# Patient Record
Sex: FEMALE | Race: WHITE | NOT HISPANIC OR LATINO | URBAN - NONMETROPOLITAN AREA
[De-identification: names, ages, dates, MRNs, and addresses within clinical notes are randomized per-mention and may not be internally consistent; named-entity substitution may affect disease eponyms.]

---

## 2020-09-23 ENCOUNTER — TELEPHONE ENCOUNTER (OUTPATIENT)
Dept: URBAN - NONMETROPOLITAN AREA CLINIC 2 | Facility: CLINIC | Age: 60
End: 2020-09-23

## 2020-10-07 ENCOUNTER — LAB OUTSIDE AN ENCOUNTER (OUTPATIENT)
Dept: URBAN - NONMETROPOLITAN AREA CLINIC 2 | Facility: CLINIC | Age: 60
End: 2020-10-07

## 2020-10-07 ENCOUNTER — OFFICE VISIT (OUTPATIENT)
Dept: URBAN - NONMETROPOLITAN AREA CLINIC 2 | Facility: CLINIC | Age: 60
End: 2020-10-07
Payer: COMMERCIAL

## 2020-10-07 VITALS
HEART RATE: 74 BPM | HEIGHT: 65 IN | DIASTOLIC BLOOD PRESSURE: 78 MMHG | BODY MASS INDEX: 33.32 KG/M2 | WEIGHT: 200 LBS | SYSTOLIC BLOOD PRESSURE: 138 MMHG

## 2020-10-07 DIAGNOSIS — K74.60 CIRRHOSIS: ICD-10-CM

## 2020-10-07 DIAGNOSIS — R10.9 ABDOMINAL PAIN: ICD-10-CM

## 2020-10-07 DIAGNOSIS — K21.9 GERD (GASTROESOPHAGEAL REFLUX DISEASE): ICD-10-CM

## 2020-10-07 DIAGNOSIS — E61.1 LOW IRON: ICD-10-CM

## 2020-10-07 DIAGNOSIS — K76.89: ICD-10-CM

## 2020-10-07 DIAGNOSIS — Z85.038 PERSONAL HISTORY OF COLON CANCER: ICD-10-CM

## 2020-10-07 DIAGNOSIS — R19.4 BOWEL HABIT CHANGES: ICD-10-CM

## 2020-10-07 PROCEDURE — 3017F COLORECTAL CA SCREEN DOC REV: CPT | Performed by: NURSE PRACTITIONER

## 2020-10-07 PROCEDURE — 99213 OFFICE O/P EST LOW 20 MIN: CPT | Performed by: NURSE PRACTITIONER

## 2020-10-07 PROCEDURE — G8427 DOCREV CUR MEDS BY ELIG CLIN: HCPCS | Performed by: NURSE PRACTITIONER

## 2020-10-07 PROCEDURE — G8420 CALC BMI NORM PARAMETERS: HCPCS | Performed by: NURSE PRACTITIONER

## 2020-10-07 RX ORDER — ZOLPIDEM TARTRATE 10 MG/1
TAKE 1 TABLET (10 MG) BY ORAL ROUTE ONCE DAILY AT BEDTIME TABLET, FILM COATED ORAL 1
Qty: 0 | Refills: 0 | Status: ACTIVE | COMMUNITY
Start: 1900-01-01

## 2020-10-07 RX ORDER — LACTULOSE 10 G/15ML
45 ML TID AS NEEDED FOR CONSTIPATION SOLUTION ORAL TID
Qty: 4050 ML | Refills: 6 | OUTPATIENT
Start: 2020-10-07 | End: 2021-05-05

## 2020-10-07 RX ORDER — DULOXETINE 60 MG/1
TAKE 2 CAPSULES (120 MG) BY ORAL ROUTE ONCE DAILY CAPSULE, DELAYED RELEASE PELLETS ORAL 1
Qty: 0 | Refills: 0 | Status: ACTIVE | COMMUNITY
Start: 1900-01-01

## 2020-10-07 RX ORDER — FAMOTIDINE 40 MG/1
TAKE 1 TABLET BY ORAL ROUTE DAILY FOR 90 DAYS TABLET, FILM COATED ORAL 1
Qty: 90 | Refills: 3 | Status: ACTIVE | COMMUNITY
Start: 2020-05-27 | End: 2021-05-22

## 2020-10-07 RX ORDER — ATENOLOL 50 MG/1
TAKE 1 TABLET (50 MG) BY ORAL ROUTE 2 TIMES PER DAY TABLET ORAL 2
Qty: 0 | Refills: 0 | Status: ACTIVE | COMMUNITY
Start: 1900-01-01

## 2020-10-07 RX ORDER — FAMOTIDINE 40 MG/1
TAKE 1 TABLET BY ORAL ROUTE AT BEDTIME FOR 90 DAYS TABLET, FILM COATED ORAL ONCE A DAY
Qty: 90 TABLET | Refills: 3
Start: 2020-05-27

## 2020-10-07 RX ORDER — OXYCODONE HYDROCHLORIDE 5 MG/1
CAPSULE ORAL
Qty: 0 | Refills: 0 | Status: ACTIVE | COMMUNITY
Start: 1900-01-01

## 2020-10-07 RX ORDER — PROMETHAZINE HYDROCHLORIDE 25 MG/1
TABLET ORAL
Qty: 0 | Refills: 0 | Status: ACTIVE | COMMUNITY
Start: 1900-01-01

## 2020-10-07 RX ORDER — TIZANIDINE HYDROCHLORIDE 4 MG/1
CAPSULE ORAL
Qty: 0 | Refills: 0 | Status: ACTIVE | COMMUNITY
Start: 1900-01-01

## 2020-10-07 RX ORDER — METFORMIN HYDROCHLORIDE 500 MG/1
TAKE 1 TABLET (500 MG) BY ORAL ROUTE 2 TIMES PER DAY WITH MORNING AND EVENING MEALS TABLET, COATED ORAL 2
Qty: 0 | Refills: 0 | Status: ACTIVE | COMMUNITY
Start: 1900-01-01

## 2020-10-07 RX ORDER — ROPINIROLE HYDROCHLORIDE 0.25 MG/1
TAKE 1 TABLET (0.25 MG) BY ORAL ROUTE 1-3 HOURS BEFORE BEDTIME TABLET, FILM COATED ORAL
Qty: 1 | Refills: 0 | Status: ACTIVE | COMMUNITY
Start: 1900-01-01

## 2020-10-07 NOTE — HPI-OTHER HISTORIES
History Of Present Illness    4/2019 Ms. 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 She is a previous patient of Dr. Abdalla and she has been seeing a hepatologist at [place] every 6 months. She has been getting MRI's every 6 months. Her last MRI was in January with dysplastic appearing nodules. These have been stable. EGD 2018 with trace varices, retained food in her stomach, and portal HTN. She is taking protonix 40mg BID and zantac. Her reflux well controlled. She denies any nausea or vomiting. She denies any ascites. She denies any free bleeding. She had mental changes with being forgetful and sedated in the morning. She had been taking Ambien. This is resolved. She is not taking lactulose.  She has a history of colon cancer in 2011 with resection and chemo. Last colonoscopy was 2018 and normal except for diverticulosis. She is having diarrhea every meal. Imodium is not helping.  CS  6/17/2019 Ms. Correa is here for f/u of Cirrhosis. She is well compensated. She had an MRI with stable dysplastic appearing nodules. Her reflux is well controleld on Protonix 40mg BID. She on atenolol for trace varices. She denies any increase in bleeding or ascites. She has some mild confusion thought to be med related. She was having diarrhea and given colestipol. This made her sick. She is now having constipation. CS  September 23, 2019 Ms. Dunaway is here for f/u of Cirrhosis. Her MRI ni June was stable and due in December. She denies any reflux since taking protonix 40mg BID. She denies any increase in bleeding. She has a sore on her shin that has been present for several months. She saw her PCP with a bx. She has had some all over swelling. Her PCP started her on a diuretic. She has been taking this for a month. Her potassium was a little low in June and has not been rechecked. She was having minor confusion at her last OV. She denies any further issues. She has constipation to diarrhea. She is not taking anything at this time. She is having some LLQ pain after eating. She was given ibuprofen by her PCP. She was given thyroid medication at her last PCP OV. She is not taking this.  She has had some numbness and tingling in her hands. She was told this was coming from her neck. She is seeing a surgeon next month. CS  February 24, 2020 Ms. Correa is here for f/u of Cirrhosis. She had a MRI in December that was stable. Her reflux is well controlled with PPI daily. She has mild breakthrough if she eats late. She denies any increase in bleeding. She denies any abdominal swelling. Her bowels are now normal. She does not need the colestipol or lactulose. She is having surgery next month on her neck. She overall is feeling well. She has not followed up with her PCP regarding her thyroid. CS

## 2020-10-07 NOTE — HPI-TODAY'S VISIT:
10/7/2020 Ms. Correa is here for f/u of Cirrhosis. She had surgery on her neck since her last OV. She is back on pain medication. She is starting to feel better. She is having some worsening reflux with food coming back up when she lays down. She is having some nausea. She is having some RUQ pain intermittently. She is due for MRI for HCC and surveillance of a dysplastic nodule. She denies any swelling, increase in bleeding, or mental changes. Her bowels are currently normal without lactulose. CS

## 2020-10-08 LAB
AFP, SERUM, TUMOR MARKER: 11.9
ALBUMIN: 3.7
ALKALINE PHOSPHATASE: 151
ALT (SGPT): 18
AST (SGOT): 31
BILIRUBIN, DIRECT: 0.29
BILIRUBIN, TOTAL: 0.8
BUN/CREATININE RATIO: 10
BUN: 7
CARBON DIOXIDE, TOTAL: 28
CHLORIDE: 94
CREATININE: 0.73
EGFR IF AFRICN AM: 104
EGFR IF NONAFRICN AM: 90
GLUCOSE: 241
HEMATOCRIT: 33.6
HEMOGLOBIN: 11.1
INR: 1.1
MCH: 25.5
MCHC: 33
MCV: 77
NRBC: (no result)
PLATELETS: 133
POTASSIUM: 3
PROTEIN, TOTAL: 6.9
PROTHROMBIN TIME: 11.8
RBC: 4.36
RDW: 15.6
SODIUM: 138
WBC: 4

## 2020-11-05 ENCOUNTER — TELEPHONE ENCOUNTER (OUTPATIENT)
Dept: URBAN - METROPOLITAN AREA CLINIC 92 | Facility: CLINIC | Age: 60
End: 2020-11-05

## 2020-11-05 RX ORDER — DIAZEPAM 2 MG/1
TAKE 1 TABLET BY ORAL ROUTE 30 MINS PRIOR TO MRI TABLET ORAL 2
Qty: 1 | Refills: 0
Start: 2019-09-23

## 2020-11-18 ENCOUNTER — OFFICE VISIT (OUTPATIENT)
Dept: URBAN - NONMETROPOLITAN AREA CLINIC 2 | Facility: CLINIC | Age: 60
End: 2020-11-18
Payer: COMMERCIAL

## 2020-11-18 VITALS
HEIGHT: 65 IN | SYSTOLIC BLOOD PRESSURE: 152 MMHG | BODY MASS INDEX: 34.39 KG/M2 | HEART RATE: 78 BPM | WEIGHT: 206.4 LBS | DIASTOLIC BLOOD PRESSURE: 81 MMHG | TEMPERATURE: 97.1 F

## 2020-11-18 DIAGNOSIS — R10.9 ABDOMINAL PAIN: ICD-10-CM

## 2020-11-18 DIAGNOSIS — E61.1 LOW IRON: ICD-10-CM

## 2020-11-18 DIAGNOSIS — Z85.038 PERSONAL HISTORY OF COLON CANCER: ICD-10-CM

## 2020-11-18 DIAGNOSIS — K76.89: ICD-10-CM

## 2020-11-18 DIAGNOSIS — K74.60 CIRRHOSIS: ICD-10-CM

## 2020-11-18 DIAGNOSIS — K21.9 GERD (GASTROESOPHAGEAL REFLUX DISEASE): ICD-10-CM

## 2020-11-18 DIAGNOSIS — R19.4 BOWEL HABIT CHANGES: ICD-10-CM

## 2020-11-18 PROCEDURE — G8420 CALC BMI NORM PARAMETERS: HCPCS | Performed by: NURSE PRACTITIONER

## 2020-11-18 PROCEDURE — 99213 OFFICE O/P EST LOW 20 MIN: CPT | Performed by: NURSE PRACTITIONER

## 2020-11-18 PROCEDURE — 3017F COLORECTAL CA SCREEN DOC REV: CPT | Performed by: NURSE PRACTITIONER

## 2020-11-18 PROCEDURE — G8427 DOCREV CUR MEDS BY ELIG CLIN: HCPCS | Performed by: NURSE PRACTITIONER

## 2020-11-18 RX ORDER — ROPINIROLE HYDROCHLORIDE 0.25 MG/1
TAKE 1 TABLET (0.25 MG) BY ORAL ROUTE 1-3 HOURS BEFORE BEDTIME TABLET, FILM COATED ORAL
Qty: 1 | Refills: 0 | Status: ACTIVE | COMMUNITY
Start: 1900-01-01

## 2020-11-18 RX ORDER — FAMOTIDINE 40 MG/1
TAKE 1 TABLET BY ORAL ROUTE AT BEDTIME FOR 90 DAYS TABLET, FILM COATED ORAL ONCE A DAY
Qty: 90 TABLET | Refills: 3

## 2020-11-18 RX ORDER — FAMOTIDINE 40 MG/1
TAKE 1 TABLET BY ORAL ROUTE AT BEDTIME FOR 90 DAYS TABLET, FILM COATED ORAL ONCE A DAY
Qty: 90 TABLET | Refills: 3 | Status: ACTIVE | COMMUNITY
Start: 2020-05-27

## 2020-11-18 RX ORDER — LACTULOSE 10 G/15ML
45 ML TID AS NEEDED FOR CONSTIPATION SOLUTION ORAL TID
Qty: 4050 ML | Refills: 6 | Status: ACTIVE | COMMUNITY
Start: 2020-10-07 | End: 2021-05-05

## 2020-11-18 RX ORDER — ATENOLOL 50 MG/1
TAKE 1 TABLET (50 MG) BY ORAL ROUTE 2 TIMES PER DAY TABLET ORAL 2
Qty: 0 | Refills: 0 | Status: ACTIVE | COMMUNITY
Start: 1900-01-01

## 2020-11-18 RX ORDER — ZOLPIDEM TARTRATE 10 MG/1
TAKE 1 TABLET (10 MG) BY ORAL ROUTE ONCE DAILY AT BEDTIME TABLET, FILM COATED ORAL 1
Qty: 0 | Refills: 0 | Status: ACTIVE | COMMUNITY
Start: 1900-01-01

## 2020-11-18 RX ORDER — DULOXETINE 60 MG/1
TAKE 2 CAPSULES (120 MG) BY ORAL ROUTE ONCE DAILY CAPSULE, DELAYED RELEASE PELLETS ORAL 1
Qty: 0 | Refills: 0 | Status: ACTIVE | COMMUNITY
Start: 1900-01-01

## 2020-11-18 RX ORDER — LACTULOSE 10 G/15ML
45 ML TID AS NEEDED FOR CONSTIPATION SOLUTION ORAL TID
Qty: 4050 ML | Refills: 6 | OUTPATIENT

## 2020-11-18 RX ORDER — PROMETHAZINE HYDROCHLORIDE 25 MG/1
TABLET ORAL
Qty: 0 | Refills: 0 | Status: ACTIVE | COMMUNITY
Start: 1900-01-01

## 2020-11-18 RX ORDER — METFORMIN HYDROCHLORIDE 500 MG/1
TAKE 1 TABLET (500 MG) BY ORAL ROUTE 2 TIMES PER DAY WITH MORNING AND EVENING MEALS TABLET, COATED ORAL 2
Qty: 0 | Refills: 0 | Status: ACTIVE | COMMUNITY
Start: 1900-01-01

## 2020-11-18 RX ORDER — DIAZEPAM 2 MG/1
TAKE 1 TABLET BY ORAL ROUTE 30 MINS PRIOR TO MRI TABLET ORAL 2
Qty: 1 | Refills: 0 | Status: ACTIVE | COMMUNITY
Start: 2019-09-23

## 2020-11-18 RX ORDER — TIZANIDINE HYDROCHLORIDE 4 MG/1
CAPSULE ORAL
Qty: 0 | Refills: 0 | Status: ACTIVE | COMMUNITY
Start: 1900-01-01

## 2020-11-18 RX ORDER — OXYCODONE HYDROCHLORIDE 5 MG/1
CAPSULE ORAL
Qty: 0 | Refills: 0 | Status: ACTIVE | COMMUNITY
Start: 1900-01-01

## 2020-11-18 NOTE — HPI-OTHER HISTORIES
History Of Present Illness    4/2019 Ms. 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 She is a previous patient of Dr. Abdalla and she has been seeing a hepatologist at [place] every 6 months. She has been getting MRI's every 6 months. Her last MRI was in January with dysplastic appearing nodules. These have been stable. EGD 2018 with trace varices, retained food in her stomach, and portal HTN. She is taking protonix 40mg BID and zantac. Her reflux well controlled. She denies any nausea or vomiting. She denies any ascites. She denies any free bleeding. She had mental changes with being forgetful and sedated in the morning. She had been taking Ambien. This is resolved. She is not taking lactulose.  She has a history of colon cancer in 2011 with resection and chemo. Last colonoscopy was 2018 and normal except for diverticulosis. She is having diarrhea every meal. Imodium is not helping.  CS  6/17/2019 Ms. Correa is here for f/u of Cirrhosis. She is well compensated. She had an MRI with stable dysplastic appearing nodules. Her reflux is well controleld on Protonix 40mg BID. She on atenolol for trace varices. She denies any increase in bleeding or ascites. She has some mild confusion thought to be med related. She was having diarrhea and given colestipol. This made her sick. She is now having constipation. CS  September 23, 2019 Ms. Dunaway is here for f/u of Cirrhosis. Her MRI ni June was stable and due in December. She denies any reflux since taking protonix 40mg BID. She denies any increase in bleeding. She has a sore on her shin that has been present for several months. She saw her PCP with a bx. She has had some all over swelling. Her PCP started her on a diuretic. She has been taking this for a month. Her potassium was a little low in June and has not been rechecked. She was having minor confusion at her last OV. She denies any further issues. She has constipation to diarrhea. She is not taking anything at this time. She is having some LLQ pain after eating. She was given ibuprofen by her PCP. She was given thyroid medication at her last PCP OV. She is not taking this.  She has had some numbness and tingling in her hands. She was told this was coming from her neck. She is seeing a surgeon next month. CS  February 24, 2020 Ms. Correa is here for f/u of Cirrhosis. She had a MRI in December that was stable. Her reflux is well controlled with PPI daily. She has mild breakthrough if she eats late. She denies any increase in bleeding. She denies any abdominal swelling. Her bowels are now normal. She does not need the colestipol or lactulose. She is having surgery next month on her neck. She overall is feeling well. She has not followed up with her PCP regarding her thyroid. CS   10/7/2020 Ms. Correa is here for f/u of Cirrhosis. She had surgery on her neck since her last OV. She is back on pain medication. She is starting to feel better. She is having some worsening reflux with food coming back up when she lays down. She is having some nausea. She is having some RUQ pain intermittently. She is due for MRI for HCC and surveillance of a dysplastic nodule. She denies any swelling, increase in bleeding, or mental changes. Her bowels are currently normal without lactulose. CS

## 2020-11-18 NOTE — HPI-TODAY'S VISIT:
11/18/2020 Ms. Correa is here for f/u of Cirrhosis. She had surgery on her neck and was started on pain medication. This made her reflux worse. She was started on pepcid. She was unable to take this because she felt it made her swell. She is no longer having this symptoms. She does however had a lot of bloating and gas. She is no longer taking lactulose and having some constipation. She had her MRI 11/12 for HCC and surveillance of a dysplastic nodule this was stable. She denies any swelling, increase in bleeding, or mental changes. She is due for colonoscopy next year. CS

## 2021-02-18 ENCOUNTER — OFFICE VISIT (OUTPATIENT)
Dept: URBAN - NONMETROPOLITAN AREA CLINIC 2 | Facility: CLINIC | Age: 61
End: 2021-02-18

## 2021-02-25 ENCOUNTER — LAB OUTSIDE AN ENCOUNTER (OUTPATIENT)
Dept: URBAN - NONMETROPOLITAN AREA CLINIC 2 | Facility: CLINIC | Age: 61
End: 2021-02-25

## 2021-02-25 ENCOUNTER — OFFICE VISIT (OUTPATIENT)
Dept: URBAN - NONMETROPOLITAN AREA CLINIC 2 | Facility: CLINIC | Age: 61
End: 2021-02-25
Payer: COMMERCIAL

## 2021-02-25 VITALS
HEART RATE: 70 BPM | HEIGHT: 65 IN | WEIGHT: 197 LBS | DIASTOLIC BLOOD PRESSURE: 85 MMHG | TEMPERATURE: 97.5 F | BODY MASS INDEX: 32.82 KG/M2 | SYSTOLIC BLOOD PRESSURE: 133 MMHG

## 2021-02-25 DIAGNOSIS — R19.4 BOWEL HABIT CHANGES: ICD-10-CM

## 2021-02-25 DIAGNOSIS — K21.9 GERD (GASTROESOPHAGEAL REFLUX DISEASE): ICD-10-CM

## 2021-02-25 DIAGNOSIS — Z85.038 PERSONAL HISTORY OF COLON CANCER: ICD-10-CM

## 2021-02-25 DIAGNOSIS — K74.60 CIRRHOSIS: ICD-10-CM

## 2021-02-25 DIAGNOSIS — K76.89: ICD-10-CM

## 2021-02-25 DIAGNOSIS — E61.1 LOW IRON: ICD-10-CM

## 2021-02-25 DIAGNOSIS — R10.9 ABDOMINAL PAIN: ICD-10-CM

## 2021-02-25 PROCEDURE — 99214 OFFICE O/P EST MOD 30 MIN: CPT | Performed by: NURSE PRACTITIONER

## 2021-02-25 RX ORDER — DULOXETINE 60 MG/1
TAKE 2 CAPSULES (120 MG) BY ORAL ROUTE ONCE DAILY CAPSULE, DELAYED RELEASE PELLETS ORAL 1
Qty: 0 | Refills: 0 | Status: ACTIVE | COMMUNITY
Start: 1900-01-01

## 2021-02-25 RX ORDER — LACTULOSE 10 G/15ML
45 ML TID AS NEEDED FOR CONSTIPATION SOLUTION ORAL TID
Qty: 4050 ML | Refills: 6 | Status: ACTIVE | COMMUNITY

## 2021-02-25 RX ORDER — FAMOTIDINE 40 MG/1
TAKE 1 TABLET BY ORAL ROUTE AT BEDTIME FOR 90 DAYS TABLET, FILM COATED ORAL ONCE A DAY
Qty: 90 TABLET | Refills: 3 | Status: ACTIVE | COMMUNITY

## 2021-02-25 RX ORDER — OXYCODONE HYDROCHLORIDE 5 MG/1
CAPSULE ORAL
Qty: 0 | Refills: 0 | Status: ACTIVE | COMMUNITY
Start: 1900-01-01

## 2021-02-25 RX ORDER — FAMOTIDINE 40 MG/1
TAKE 1 TABLET BY ORAL ROUTE AT BEDTIME FOR 90 DAYS TABLET, FILM COATED ORAL ONCE A DAY
Qty: 90 TABLET | Refills: 3

## 2021-02-25 RX ORDER — ZOLPIDEM TARTRATE 10 MG/1
TAKE 1 TABLET (10 MG) BY ORAL ROUTE ONCE DAILY AT BEDTIME TABLET, FILM COATED ORAL 1
Qty: 0 | Refills: 0 | Status: ACTIVE | COMMUNITY
Start: 1900-01-01

## 2021-02-25 RX ORDER — TIZANIDINE HYDROCHLORIDE 4 MG/1
CAPSULE ORAL
Qty: 0 | Refills: 0 | Status: ACTIVE | COMMUNITY
Start: 1900-01-01

## 2021-02-25 RX ORDER — ROPINIROLE HYDROCHLORIDE 0.25 MG/1
TAKE 1 TABLET (0.25 MG) BY ORAL ROUTE 1-3 HOURS BEFORE BEDTIME TABLET, FILM COATED ORAL
Qty: 1 | Refills: 0 | Status: ACTIVE | COMMUNITY
Start: 1900-01-01

## 2021-02-25 RX ORDER — DIAZEPAM 2 MG/1
TAKE 1 TABLET BY ORAL ROUTE 30 MINS PRIOR TO MRI TABLET ORAL 2
Qty: 1 | Refills: 0 | Status: ACTIVE | COMMUNITY
Start: 2019-09-23

## 2021-02-25 RX ORDER — LACTULOSE 10 G/15ML
45 ML TID AS NEEDED FOR CONSTIPATION SOLUTION ORAL TID
Qty: 4050 ML | Refills: 6 | OUTPATIENT

## 2021-02-25 RX ORDER — PROMETHAZINE HYDROCHLORIDE 25 MG/1
TABLET ORAL
Qty: 0 | Refills: 0 | Status: ACTIVE | COMMUNITY
Start: 1900-01-01

## 2021-02-25 RX ORDER — ATENOLOL 50 MG/1
TAKE 1 TABLET (50 MG) BY ORAL ROUTE 2 TIMES PER DAY TABLET ORAL 2
Qty: 0 | Refills: 0 | Status: ACTIVE | COMMUNITY
Start: 1900-01-01

## 2021-02-25 RX ORDER — METFORMIN HYDROCHLORIDE 500 MG/1
TAKE 1 TABLET (500 MG) BY ORAL ROUTE 2 TIMES PER DAY WITH MORNING AND EVENING MEALS TABLET, COATED ORAL 2
Qty: 0 | Refills: 0 | Status: ACTIVE | COMMUNITY
Start: 1900-01-01

## 2021-02-25 NOTE — HPI-TODAY'S VISIT:
2/25/2021 Ms. Correa is here for f/u of Cirrhosis and colon cancer surgery. She is out of her pain medication today and having a lot of nerve pain down her legs. This makes it hard to walk at times. Her reflux is well controlled. Her bowels are more normal without lactulose or colestipol. if she gets backed up she will take lactulose with relief. She denies any ascites. She denies any bleeding. She denies any mental changes. She had her MRI 11/12 for HCC and surveillance of a dysplastic nodule this was stable. Overall, she is feeling well. CS

## 2021-02-25 NOTE — HPI-OTHER HISTORIES
History Of Present Illness    4/2019 Ms. 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 She is a previous patient of Dr. Abdalla and she has been seeing a hepatologist at [place] every 6 months. She has been getting MRI's every 6 months. Her last MRI was in January with dysplastic appearing nodules. These have been stable. EGD 2018 with trace varices, retained food in her stomach, and portal HTN. She is taking protonix 40mg BID and zantac. Her reflux well controlled. She denies any nausea or vomiting. She denies any ascites. She denies any free bleeding. She had mental changes with being forgetful and sedated in the morning. She had been taking Ambien. This is resolved. She is not taking lactulose.  She has a history of colon cancer in 2011 with resection and chemo. Last colonoscopy was 2018 and normal except for diverticulosis. She is having diarrhea every meal. Imodium is not helping.  CS  6/17/2019 Ms. Correa is here for f/u of Cirrhosis. She is well compensated. She had an MRI with stable dysplastic appearing nodules. Her reflux is well controleld on Protonix 40mg BID. She on atenolol for trace varices. She denies any increase in bleeding or ascites. She has some mild confusion thought to be med related. She was having diarrhea and given colestipol. This made her sick. She is now having constipation. CS  September 23, 2019 Ms. Dunaway is here for f/u of Cirrhosis. Her MRI ni June was stable and due in December. She denies any reflux since taking protonix 40mg BID. She denies any increase in bleeding. She has a sore on her shin that has been present for several months. She saw her PCP with a bx. She has had some all over swelling. Her PCP started her on a diuretic. She has been taking this for a month. Her potassium was a little low in June and has not been rechecked. She was having minor confusion at her last OV. She denies any further issues. She has constipation to diarrhea. She is not taking anything at this time. She is having some LLQ pain after eating. She was given ibuprofen by her PCP. She was given thyroid medication at her last PCP OV. She is not taking this.  She has had some numbness and tingling in her hands. She was told this was coming from her neck. She is seeing a surgeon next month. CS  February 24, 2020 Ms. Correa is here for f/u of Cirrhosis. She had a MRI in December that was stable. Her reflux is well controlled with PPI daily. She has mild breakthrough if she eats late. She denies any increase in bleeding. She denies any abdominal swelling. Her bowels are now normal. She does not need the colestipol or lactulose. She is having surgery next month on her neck. She overall is feeling well. She has not followed up with her PCP regarding her thyroid. CS   10/7/2020 Ms. Correa is here for f/u of Cirrhosis. She had surgery on her neck since her last OV. She is back on pain medication. She is starting to feel better. She is having some worsening reflux with food coming back up when she lays down. She is having some nausea. She is having some RUQ pain intermittently. She is due for MRI for HCC and surveillance of a dysplastic nodule. She denies any swelling, increase in bleeding, or mental changes. Her bowels are currently normal without lactulose. CS   11/18/2020 Ms. Correa is here for f/u of Cirrhosis. She had surgery on her neck and was started on pain medication. This made her reflux worse. She was started on pepcid. She was unable to take this because she felt it made her swell. She is no longer having this symptoms. She does however had a lot of bloating and gas. She is no longer taking lactulose and having some constipation. She had her MRI 11/12 for HCC and surveillance of a dysplastic nodule this was stable. She denies any swelling, increase in bleeding, or mental changes. She is due for colonoscopy next year. CS

## 2021-02-26 LAB
ALBUMIN: 4.1
ALKALINE PHOSPHATASE: 136
ALT (SGPT): 18
AST (SGOT): 24
BILIRUBIN, DIRECT: 0.21
BILIRUBIN, TOTAL: 0.5
BUN/CREATININE RATIO: 13
BUN: 9
CARBON DIOXIDE, TOTAL: 27
CHLORIDE: 98
CREATININE: 0.72
EGFR IF AFRICN AM: 105
EGFR IF NONAFRICN AM: 91
GLUCOSE: 114
HEMATOCRIT: 42
HEMOGLOBIN: 13.1
INR: 1.1
IRON BIND.CAP.(TIBC): 483
IRON SATURATION: 8
IRON: 40
MCH: 24.6
MCHC: 31.2
MCV: 79
NRBC: (no result)
PLATELETS: 202
POTASSIUM: 3.4
PROTEIN, TOTAL: 7.3
PROTHROMBIN TIME: 11.3
RBC: 5.33
RDW: 16.5
SODIUM: 142
UIBC: 443
WBC: 10.2

## 2021-04-01 ENCOUNTER — OFFICE VISIT (OUTPATIENT)
Dept: URBAN - NONMETROPOLITAN AREA SURGERY CENTER 1 | Facility: SURGERY CENTER | Age: 61
End: 2021-04-01
Payer: COMMERCIAL

## 2021-04-01 DIAGNOSIS — K62.89 ANAL BURNING: ICD-10-CM

## 2021-04-01 DIAGNOSIS — Z85.038 H/O COLON CANCER, STAGE I: ICD-10-CM

## 2021-04-01 PROCEDURE — G8907 PT DOC NO EVENTS ON DISCHARG: HCPCS | Performed by: INTERNAL MEDICINE

## 2021-04-01 PROCEDURE — 45385 COLONOSCOPY W/LESION REMOVAL: CPT | Performed by: INTERNAL MEDICINE

## 2021-05-26 ENCOUNTER — WEB ENCOUNTER (OUTPATIENT)
Dept: URBAN - NONMETROPOLITAN AREA CLINIC 2 | Facility: CLINIC | Age: 61
End: 2021-05-26

## 2021-05-26 ENCOUNTER — OFFICE VISIT (OUTPATIENT)
Dept: URBAN - NONMETROPOLITAN AREA CLINIC 2 | Facility: CLINIC | Age: 61
End: 2021-05-26
Payer: COMMERCIAL

## 2021-05-26 VITALS
WEIGHT: 195.4 LBS | SYSTOLIC BLOOD PRESSURE: 111 MMHG | HEART RATE: 64 BPM | TEMPERATURE: 96.9 F | HEIGHT: 65 IN | BODY MASS INDEX: 32.55 KG/M2 | DIASTOLIC BLOOD PRESSURE: 61 MMHG

## 2021-05-26 DIAGNOSIS — K74.60 CIRRHOSIS: ICD-10-CM

## 2021-05-26 DIAGNOSIS — K76.89: ICD-10-CM

## 2021-05-26 DIAGNOSIS — R10.9 ABDOMINAL PAIN: ICD-10-CM

## 2021-05-26 DIAGNOSIS — K21.9 GERD (GASTROESOPHAGEAL REFLUX DISEASE): ICD-10-CM

## 2021-05-26 DIAGNOSIS — R19.4 BOWEL HABIT CHANGES: ICD-10-CM

## 2021-05-26 DIAGNOSIS — Z85.038 PERSONAL HISTORY OF COLON CANCER: ICD-10-CM

## 2021-05-26 PROCEDURE — 99214 OFFICE O/P EST MOD 30 MIN: CPT | Performed by: INTERNAL MEDICINE

## 2021-05-26 RX ORDER — ROPINIROLE HYDROCHLORIDE 0.25 MG/1
TAKE 1 TABLET (0.25 MG) BY ORAL ROUTE 1-3 HOURS BEFORE BEDTIME TABLET, FILM COATED ORAL
Qty: 1 | Refills: 0 | COMMUNITY
Start: 1900-01-01

## 2021-05-26 RX ORDER — PROMETHAZINE HYDROCHLORIDE 25 MG/1
TABLET ORAL
Qty: 0 | Refills: 0 | COMMUNITY
Start: 1900-01-01

## 2021-05-26 RX ORDER — LACTULOSE 10 G/15ML
45 ML TID AS NEEDED FOR CONSTIPATION SOLUTION ORAL TID
Qty: 4050 ML | Refills: 6 | OUTPATIENT

## 2021-05-26 RX ORDER — OXYCODONE HYDROCHLORIDE 5 MG/1
CAPSULE ORAL
Qty: 0 | Refills: 0 | COMMUNITY
Start: 1900-01-01

## 2021-05-26 RX ORDER — DIAZEPAM 2 MG/1
TAKE 1 TABLET BY ORAL ROUTE 30 MINS PRIOR TO MRI TABLET ORAL 2
Qty: 1 | Refills: 0 | COMMUNITY
Start: 2019-09-23

## 2021-05-26 RX ORDER — DIAZEPAM 2 MG/1
TAKE 1 TABLET BY ORAL ROUTE 30 MINS PRIOR TO MRI TABLET ORAL 2
Qty: 1 | Refills: 0
Start: 2019-09-23

## 2021-05-26 RX ORDER — METFORMIN HYDROCHLORIDE 500 MG/1
TAKE 1 TABLET (500 MG) BY ORAL ROUTE 2 TIMES PER DAY WITH MORNING AND EVENING MEALS TABLET, COATED ORAL 2
Qty: 0 | Refills: 0 | COMMUNITY
Start: 1900-01-01

## 2021-05-26 RX ORDER — DULOXETINE 60 MG/1
TAKE 2 CAPSULES (120 MG) BY ORAL ROUTE ONCE DAILY CAPSULE, DELAYED RELEASE PELLETS ORAL 1
Qty: 0 | Refills: 0 | COMMUNITY
Start: 1900-01-01

## 2021-05-26 RX ORDER — FAMOTIDINE 40 MG/1
TAKE 1 TABLET BY ORAL ROUTE AT BEDTIME FOR 90 DAYS TABLET, FILM COATED ORAL ONCE A DAY
Qty: 90 TABLET | Refills: 3 | COMMUNITY

## 2021-05-26 RX ORDER — LACTULOSE 10 G/15ML
45 ML TID AS NEEDED FOR CONSTIPATION SOLUTION ORAL TID
Qty: 4050 ML | Refills: 6 | COMMUNITY

## 2021-05-26 RX ORDER — ZOLPIDEM TARTRATE 10 MG/1
TAKE 1 TABLET (10 MG) BY ORAL ROUTE ONCE DAILY AT BEDTIME TABLET, FILM COATED ORAL 1
Qty: 0 | Refills: 0 | COMMUNITY
Start: 1900-01-01

## 2021-05-26 RX ORDER — ATENOLOL 50 MG/1
TAKE 1 TABLET (50 MG) BY ORAL ROUTE 2 TIMES PER DAY TABLET ORAL 2
Qty: 0 | Refills: 0 | COMMUNITY
Start: 1900-01-01

## 2021-05-26 RX ORDER — TIZANIDINE HYDROCHLORIDE 4 MG/1
CAPSULE ORAL
Qty: 0 | Refills: 0 | COMMUNITY
Start: 1900-01-01

## 2021-05-26 RX ORDER — FAMOTIDINE 40 MG/1
TAKE 1 TABLET BY ORAL ROUTE AT BEDTIME FOR 90 DAYS TABLET, FILM COATED ORAL ONCE A DAY
Qty: 90 TABLET | Refills: 3

## 2021-05-26 NOTE — HPI-OTHER HISTORIES
History Of Present Illness    4/2019 Ms. 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 She is a previous patient of Dr. Abdalla and she has been seeing a hepatologist at [place] every 6 months. She has been getting MRI's every 6 months. Her last MRI was in January with dysplastic appearing nodules. These have been stable. EGD 2018 with trace varices, retained food in her stomach, and portal HTN. She is taking protonix 40mg BID and zantac. Her reflux well controlled. She denies any nausea or vomiting. She denies any ascites. She denies any free bleeding. She had mental changes with being forgetful and sedated in the morning. She had been taking Ambien. This is resolved. She is not taking lactulose.  She has a history of colon cancer in 2011 with resection and chemo. Last colonoscopy was 2018 and normal except for diverticulosis. She is having diarrhea every meal. Imodium is not helping.  CS  6/17/2019 Ms. Correa is here for f/u of Cirrhosis. She is well compensated. She had an MRI with stable dysplastic appearing nodules. Her reflux is well controleld on Protonix 40mg BID. She on atenolol for trace varices. She denies any increase in bleeding or ascites. She has some mild confusion thought to be med related. She was having diarrhea and given colestipol. This made her sick. She is now having constipation. CS  September 23, 2019 Ms. Dunaway is here for f/u of Cirrhosis. Her MRI ni June was stable and due in December. She denies any reflux since taking protonix 40mg BID. She denies any increase in bleeding. She has a sore on her shin that has been present for several months. She saw her PCP with a bx. She has had some all over swelling. Her PCP started her on a diuretic. She has been taking this for a month. Her potassium was a little low in June and has not been rechecked. She was having minor confusion at her last OV. She denies any further issues. She has constipation to diarrhea. She is not taking anything at this time. She is having some LLQ pain after eating. She was given ibuprofen by her PCP. She was given thyroid medication at her last PCP OV. She is not taking this.  She has had some numbness and tingling in her hands. She was told this was coming from her neck. She is seeing a surgeon next month. CS  February 24, 2020 Ms. Correa is here for f/u of Cirrhosis. She had a MRI in December that was stable. Her reflux is well controlled with PPI daily. She has mild breakthrough if she eats late. She denies any increase in bleeding. She denies any abdominal swelling. Her bowels are now normal. She does not need the colestipol or lactulose. She is having surgery next month on her neck. She overall is feeling well. She has not followed up with her PCP regarding her thyroid. CS   10/7/2020 Ms. Correa is here for f/u of Cirrhosis. She had surgery on her neck since her last OV. She is back on pain medication. She is starting to feel better. She is having some worsening reflux with food coming back up when she lays down. She is having some nausea. She is having some RUQ pain intermittently. She is due for MRI for HCC and surveillance of a dysplastic nodule. She denies any swelling, increase in bleeding, or mental changes. Her bowels are currently normal without lactulose. CS   11/18/2020 Ms. Correa is here for f/u of Cirrhosis. She had surgery on her neck and was started on pain medication. This made her reflux worse. She was started on pepcid. She was unable to take this because she felt it made her swell. She is no longer having this symptoms. She does however had a lot of bloating and gas. She is no longer taking lactulose and having some constipation. She had her MRI 11/12 for HCC and surveillance of a dysplastic nodule this was stable. She denies any swelling, increase in bleeding, or mental changes. She is due for colonoscopy next year. CS   2/25/2021 Ms. Correa is here for f/u of Cirrhosis and colon cancer surgery. She is out of her pain medication today and having a lot of nerve pain down her legs. This makes it hard to walk at times. Her reflux is well controlled. Her bowels are more normal without lactulose or colestipol. if she gets backed up she will take lactulose with relief. She denies any ascites. She denies any bleeding. She denies any mental changes. She had her MRI 11/12 for HCC and surveillance of a dysplastic nodule this was stable. Overall, she is feeling well. CS

## 2021-05-26 NOTE — HPI-TODAY'S VISIT:
5/26/2021 Ms. Correa is here for f/u of Cirrhosis and colonoscopy. She continues to have pain but this is fairly well controlled with pain medication. She tries to limit how much she takes. She does have some minor fatigue from this. She denies any confusion or sedation. Her reflux is well controlled. Her bowels are more normal with lactulose prn. She has bloating at times but not too bothersome.  She denies any ascites. She denies any bleeding. She denies any mental changes. She had her MRI 11/12 for HCC and surveillance of a dysplastic nodule this was stable. She is due for repeat. Her repeat colonoscopy showed a 4mm inflammatory polyp in the rectum. Overall, she is feeling well. CS

## 2021-05-27 LAB
AFP, SERUM, TUMOR MARKER: 15
ALBUMIN: 4
ALKALINE PHOSPHATASE: 128
ALT (SGPT): 14
AST (SGOT): 30
BILIRUBIN, DIRECT: 0.3
BILIRUBIN, TOTAL: 0.8
BUN/CREATININE RATIO: 11
BUN: 8
CARBON DIOXIDE, TOTAL: 26
CHLORIDE: 95
CREATININE: 0.75
EGFR IF AFRICN AM: 99
EGFR IF NONAFRICN AM: 86
GLUCOSE: 117
HEMATOCRIT: 39.5
HEMOGLOBIN: 12.7
INR: 1.1
MCH: 26.1
MCHC: 32.2
MCV: 81
NRBC: (no result)
PLATELETS: 137
POTASSIUM: 3.4
PROTEIN, TOTAL: 7.1
PROTHROMBIN TIME: 11.6
RBC: 4.87
RDW: (no result)
SODIUM: 137
WBC: 6.5

## 2021-07-20 ENCOUNTER — TELEPHONE ENCOUNTER (OUTPATIENT)
Dept: URBAN - NONMETROPOLITAN AREA CLINIC 2 | Facility: CLINIC | Age: 61
End: 2021-07-20

## 2021-08-25 ENCOUNTER — OFFICE VISIT (OUTPATIENT)
Dept: URBAN - NONMETROPOLITAN AREA CLINIC 2 | Facility: CLINIC | Age: 61
End: 2021-08-25

## 2021-10-04 ENCOUNTER — WEB ENCOUNTER (OUTPATIENT)
Dept: URBAN - NONMETROPOLITAN AREA CLINIC 2 | Facility: CLINIC | Age: 61
End: 2021-10-04

## 2021-10-04 ENCOUNTER — OFFICE VISIT (OUTPATIENT)
Dept: URBAN - NONMETROPOLITAN AREA CLINIC 2 | Facility: CLINIC | Age: 61
End: 2021-10-04
Payer: COMMERCIAL

## 2021-10-04 VITALS
TEMPERATURE: 97.7 F | SYSTOLIC BLOOD PRESSURE: 143 MMHG | BODY MASS INDEX: 33.32 KG/M2 | WEIGHT: 200 LBS | DIASTOLIC BLOOD PRESSURE: 82 MMHG | HEIGHT: 65 IN | HEART RATE: 80 BPM

## 2021-10-04 DIAGNOSIS — K76.89: ICD-10-CM

## 2021-10-04 DIAGNOSIS — Z85.038 PERSONAL HISTORY OF COLON CANCER: ICD-10-CM

## 2021-10-04 DIAGNOSIS — R10.84 ABDOMINAL PAIN, GENERALIZED: ICD-10-CM

## 2021-10-04 DIAGNOSIS — K74.69 OTHER CIRRHOSIS OF LIVER: ICD-10-CM

## 2021-10-04 PROCEDURE — 99214 OFFICE O/P EST MOD 30 MIN: CPT | Performed by: NURSE PRACTITIONER

## 2021-10-04 RX ORDER — LACTULOSE 10 G/15ML
45 ML TID AS NEEDED FOR CONSTIPATION SOLUTION ORAL TID
Qty: 4050 ML | Refills: 6 | OUTPATIENT

## 2021-10-04 RX ORDER — FAMOTIDINE 40 MG/1
TAKE 1 TABLET BY ORAL ROUTE AT BEDTIME FOR 90 DAYS TABLET, FILM COATED ORAL ONCE A DAY
Qty: 90 TABLET | Refills: 3

## 2021-10-04 RX ORDER — ATENOLOL 50 MG/1
TAKE 1 TABLET (50 MG) BY ORAL ROUTE 2 TIMES PER DAY TABLET ORAL 2
Qty: 0 | Refills: 0 | COMMUNITY
Start: 1900-01-01

## 2021-10-04 RX ORDER — DIAZEPAM 2 MG/1
TAKE 1 TABLET BY ORAL ROUTE 30 MINS PRIOR TO MRI TABLET ORAL 2
Qty: 1 | Refills: 0

## 2021-10-04 RX ORDER — METFORMIN HYDROCHLORIDE 500 MG/1
TAKE 1 TABLET (500 MG) BY ORAL ROUTE 2 TIMES PER DAY WITH MORNING AND EVENING MEALS TABLET, COATED ORAL 2
Qty: 0 | Refills: 0 | COMMUNITY
Start: 1900-01-01

## 2021-10-04 RX ORDER — ROPINIROLE HYDROCHLORIDE 0.25 MG/1
TAKE 1 TABLET (0.25 MG) BY ORAL ROUTE 1-3 HOURS BEFORE BEDTIME TABLET, FILM COATED ORAL
Qty: 1 | Refills: 0 | COMMUNITY
Start: 1900-01-01

## 2021-10-04 RX ORDER — DULOXETINE 60 MG/1
TAKE 2 CAPSULES (120 MG) BY ORAL ROUTE ONCE DAILY CAPSULE, DELAYED RELEASE PELLETS ORAL 1
Qty: 0 | Refills: 0 | COMMUNITY
Start: 1900-01-01

## 2021-10-04 RX ORDER — OXYCODONE HYDROCHLORIDE 5 MG/1
CAPSULE ORAL
Qty: 0 | Refills: 0 | COMMUNITY
Start: 1900-01-01

## 2021-10-04 RX ORDER — ZOLPIDEM TARTRATE 10 MG/1
TAKE 1 TABLET (10 MG) BY ORAL ROUTE ONCE DAILY AT BEDTIME TABLET, FILM COATED ORAL 1
Qty: 0 | Refills: 0 | COMMUNITY
Start: 1900-01-01

## 2021-10-04 RX ORDER — DIAZEPAM 2 MG/1
TAKE 1 TABLET BY ORAL ROUTE 30 MINS PRIOR TO MRI TABLET ORAL 2
Qty: 1 | Refills: 0 | Status: ACTIVE | COMMUNITY
Start: 2019-09-23

## 2021-10-04 RX ORDER — FAMOTIDINE 40 MG/1
TAKE 1 TABLET BY ORAL ROUTE AT BEDTIME FOR 90 DAYS TABLET, FILM COATED ORAL ONCE A DAY
Qty: 90 TABLET | Refills: 3 | Status: ACTIVE | COMMUNITY

## 2021-10-04 RX ORDER — TIZANIDINE HYDROCHLORIDE 4 MG/1
CAPSULE ORAL
Qty: 0 | Refills: 0 | COMMUNITY
Start: 1900-01-01

## 2021-10-04 RX ORDER — LACTULOSE 10 G/15ML
45 ML TID AS NEEDED FOR CONSTIPATION SOLUTION ORAL TID
Qty: 4050 ML | Refills: 6 | Status: ACTIVE | COMMUNITY

## 2021-10-04 RX ORDER — PROMETHAZINE HYDROCHLORIDE 25 MG/1
TABLET ORAL
Qty: 0 | Refills: 0 | COMMUNITY
Start: 1900-01-01

## 2021-10-04 NOTE — HPI-TODAY'S VISIT:
10/4/2021 Ms. Correa is here for f/u of Cirrhosis. Since her last OV, she fell and fractured her patella and bruised her right eye. She was taking to the ER and had a negative head CT. She is making a little trouble getting around with her knee brace but doing ok. She denies any ascites. She has occassional fluid build up everywhere that her PCP has given  her lasix and potassium. She denies any constipation at this time. She will take lactulose if this happens and it works well. She denies any mental changes. She denies is bleeding. Her MRI this summer was negative for HCC and due in December. CS

## 2021-10-04 NOTE — HPI-OTHER HISTORIES
History Of Present Illness    4/2019 Ms. 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 She is a previous patient of Dr. Abdalla and she has been seeing a hepatologist at [place] every 6 months. She has been getting MRI's every 6 months. Her last MRI was in January with dysplastic appearing nodules. These have been stable. EGD 2018 with trace varices, retained food in her stomach, and portal HTN. She is taking protonix 40mg BID and zantac. Her reflux well controlled. She denies any nausea or vomiting. She denies any ascites. She denies any free bleeding. She had mental changes with being forgetful and sedated in the morning. She had been taking Ambien. This is resolved. She is not taking lactulose.  She has a history of colon cancer in 2011 with resection and chemo. Last colonoscopy was 2018 and normal except for diverticulosis. She is having diarrhea every meal. Imodium is not helping.  CS  6/17/2019 Ms. Correa is here for f/u of Cirrhosis. She is well compensated. She had an MRI with stable dysplastic appearing nodules. Her reflux is well controleld on Protonix 40mg BID. She on atenolol for trace varices. She denies any increase in bleeding or ascites. She has some mild confusion thought to be med related. She was having diarrhea and given colestipol. This made her sick. She is now having constipation. CS  September 23, 2019 Ms. Dunaway is here for f/u of Cirrhosis. Her MRI ni June was stable and due in December. She denies any reflux since taking protonix 40mg BID. She denies any increase in bleeding. She has a sore on her shin that has been present for several months. She saw her PCP with a bx. She has had some all over swelling. Her PCP started her on a diuretic. She has been taking this for a month. Her potassium was a little low in June and has not been rechecked. She was having minor confusion at her last OV. She denies any further issues. She has constipation to diarrhea. She is not taking anything at this time. She is having some LLQ pain after eating. She was given ibuprofen by her PCP. She was given thyroid medication at her last PCP OV. She is not taking this.  She has had some numbness and tingling in her hands. She was told this was coming from her neck. She is seeing a surgeon next month. CS  February 24, 2020 Ms. Correa is here for f/u of Cirrhosis. She had a MRI in December that was stable. Her reflux is well controlled with PPI daily. She has mild breakthrough if she eats late. She denies any increase in bleeding. She denies any abdominal swelling. Her bowels are now normal. She does not need the colestipol or lactulose. She is having surgery next month on her neck. She overall is feeling well. She has not followed up with her PCP regarding her thyroid. CS   10/7/2020 Ms. Correa is here for f/u of Cirrhosis. She had surgery on her neck since her last OV. She is back on pain medication. She is starting to feel better. She is having some worsening reflux with food coming back up when she lays down. She is having some nausea. She is having some RUQ pain intermittently. She is due for MRI for HCC and surveillance of a dysplastic nodule. She denies any swelling, increase in bleeding, or mental changes. Her bowels are currently normal without lactulose. CS   11/18/2020 Ms. Correa is here for f/u of Cirrhosis. She had surgery on her neck and was started on pain medication. This made her reflux worse. She was started on pepcid. She was unable to take this because she felt it made her swell. She is no longer having this symptoms. She does however had a lot of bloating and gas. She is no longer taking lactulose and having some constipation. She had her MRI 11/12 for HCC and surveillance of a dysplastic nodule this was stable. She denies any swelling, increase in bleeding, or mental changes. She is due for colonoscopy next year. CS   2/25/2021 Ms. Correa is here for f/u of Cirrhosis and colon cancer surgery. She is out of her pain medication today and having a lot of nerve pain down her legs. This makes it hard to walk at times. Her reflux is well controlled. Her bowels are more normal without lactulose or colestipol. if she gets backed up she will take lactulose with relief. She denies any ascites. She denies any bleeding. She denies any mental changes. She had her MRI 11/12 for HCC and surveillance of a dysplastic nodule this was stable. Overall, she is feeling well. CS   5/26/2021 Ms. Correa is here for f/u of Cirrhosis and colonoscopy. She continues to have pain but this is fairly well controlled with pain medication. She tries to limit how much she takes. She does have some minor fatigue from this. She denies any confusion or sedation. Her reflux is well controlled. Her bowels are more normal with lactulose prn. She has bloating at times but not too bothersome.  She denies any ascites. She denies any bleeding. She denies any mental changes. She had her MRI 11/12 for HCC and surveillance of a dysplastic nodule this was stable. She is due for repeat. Her repeat colonoscopy showed a 4mm inflammatory polyp in the rectum. Overall, she is feeling well. CS

## 2021-10-05 LAB
AFP, SERUM, TUMOR MARKER: 11.7
ALBUMIN: 4
ALKALINE PHOSPHATASE: 135
ALT (SGPT): 18
AST (SGOT): 33
BILIRUBIN, DIRECT: 0.19
BILIRUBIN, TOTAL: 0.5
BUN/CREATININE RATIO: 13
BUN: 8
CARBON DIOXIDE, TOTAL: 29
CHLORIDE: 97
CREATININE: 0.6
EGFR IF AFRICN AM: 114
EGFR IF NONAFRICN AM: 99
GLUCOSE: 148
HEMATOCRIT: 39.2
HEMOGLOBIN: 12.4
INR: 1
MCH: 26.6
MCHC: 31.6
MCV: 84
NRBC: (no result)
PLATELETS: 161
POTASSIUM: 3.7
PROTEIN, TOTAL: 7.2
PROTHROMBIN TIME: 10.9
RBC: 4.67
RDW: 14.1
SODIUM: 138
WBC: 6.1

## 2021-12-20 ENCOUNTER — LAB OUTSIDE AN ENCOUNTER (OUTPATIENT)
Dept: URBAN - NONMETROPOLITAN AREA CLINIC 2 | Facility: CLINIC | Age: 61
End: 2021-12-20

## 2022-02-10 ENCOUNTER — TELEPHONE ENCOUNTER (OUTPATIENT)
Dept: URBAN - NONMETROPOLITAN AREA CLINIC 2 | Facility: CLINIC | Age: 62
End: 2022-02-10

## 2022-03-07 ENCOUNTER — WEB ENCOUNTER (OUTPATIENT)
Dept: URBAN - NONMETROPOLITAN AREA CLINIC 2 | Facility: CLINIC | Age: 62
End: 2022-03-07

## 2022-03-07 ENCOUNTER — OFFICE VISIT (OUTPATIENT)
Dept: URBAN - NONMETROPOLITAN AREA CLINIC 2 | Facility: CLINIC | Age: 62
End: 2022-03-07
Payer: COMMERCIAL

## 2022-03-07 VITALS
HEART RATE: 69 BPM | WEIGHT: 203.4 LBS | HEIGHT: 65 IN | SYSTOLIC BLOOD PRESSURE: 151 MMHG | TEMPERATURE: 97.6 F | DIASTOLIC BLOOD PRESSURE: 87 MMHG | BODY MASS INDEX: 33.89 KG/M2

## 2022-03-07 DIAGNOSIS — R19.4 BOWEL HABIT CHANGES: ICD-10-CM

## 2022-03-07 DIAGNOSIS — K74.60 CIRRHOSIS: ICD-10-CM

## 2022-03-07 DIAGNOSIS — K76.89: ICD-10-CM

## 2022-03-07 DIAGNOSIS — Z85.038 PERSONAL HISTORY OF COLON CANCER: ICD-10-CM

## 2022-03-07 DIAGNOSIS — K21.9 GERD (GASTROESOPHAGEAL REFLUX DISEASE): ICD-10-CM

## 2022-03-07 DIAGNOSIS — R10.9 ABDOMINAL PAIN: ICD-10-CM

## 2022-03-07 PROCEDURE — 99214 OFFICE O/P EST MOD 30 MIN: CPT | Performed by: NURSE PRACTITIONER

## 2022-03-07 RX ORDER — FAMOTIDINE 40 MG/1
TAKE 1 TABLET BY ORAL ROUTE AT BEDTIME FOR 90 DAYS TABLET, FILM COATED ORAL ONCE A DAY
Qty: 90 TABLET | Refills: 3

## 2022-03-07 RX ORDER — LACTULOSE 10 G/15ML
45 ML TID AS NEEDED FOR CONSTIPATION SOLUTION ORAL TID
Qty: 4050 ML | Refills: 6 | OUTPATIENT

## 2022-03-07 RX ORDER — TIZANIDINE HYDROCHLORIDE 4 MG/1
CAPSULE ORAL
Qty: 0 | Refills: 0 | COMMUNITY
Start: 1900-01-01

## 2022-03-07 RX ORDER — ZOLPIDEM TARTRATE 10 MG/1
TAKE 1 TABLET (10 MG) BY ORAL ROUTE ONCE DAILY AT BEDTIME TABLET, FILM COATED ORAL 1
Qty: 0 | Refills: 0 | COMMUNITY
Start: 1900-01-01

## 2022-03-07 RX ORDER — DIAZEPAM 2 MG/1
TAKE 1 TABLET BY ORAL ROUTE 30 MINS PRIOR TO MRI TABLET ORAL 2
Qty: 1 | Refills: 0

## 2022-03-07 RX ORDER — DULOXETINE 60 MG/1
TAKE 2 CAPSULES (120 MG) BY ORAL ROUTE ONCE DAILY CAPSULE, DELAYED RELEASE PELLETS ORAL 1
Qty: 0 | Refills: 0 | COMMUNITY
Start: 1900-01-01

## 2022-03-07 RX ORDER — ROPINIROLE HYDROCHLORIDE 0.25 MG/1
TAKE 1 TABLET (0.25 MG) BY ORAL ROUTE 1-3 HOURS BEFORE BEDTIME TABLET, FILM COATED ORAL
Qty: 1 | Refills: 0 | COMMUNITY
Start: 1900-01-01

## 2022-03-07 RX ORDER — ATENOLOL 50 MG/1
TAKE 1 TABLET (50 MG) BY ORAL ROUTE 2 TIMES PER DAY TABLET ORAL 2
Qty: 0 | Refills: 0 | COMMUNITY
Start: 1900-01-01

## 2022-03-07 RX ORDER — OXYCODONE HYDROCHLORIDE 5 MG/1
CAPSULE ORAL
Qty: 0 | Refills: 0 | COMMUNITY
Start: 1900-01-01

## 2022-03-07 RX ORDER — DIAZEPAM 2 MG/1
TAKE 1 TABLET BY ORAL ROUTE 30 MINS PRIOR TO MRI TABLET ORAL 2
Qty: 1 | Refills: 0 | Status: ACTIVE | COMMUNITY

## 2022-03-07 RX ORDER — LACTULOSE 10 G/15ML
45 ML TID AS NEEDED FOR CONSTIPATION SOLUTION ORAL TID
Qty: 4050 ML | Refills: 6 | Status: ACTIVE | COMMUNITY

## 2022-03-07 RX ORDER — FAMOTIDINE 40 MG/1
TAKE 1 TABLET BY ORAL ROUTE AT BEDTIME FOR 90 DAYS TABLET, FILM COATED ORAL ONCE A DAY
Qty: 90 TABLET | Refills: 3 | Status: ACTIVE | COMMUNITY

## 2022-03-07 RX ORDER — RIFAXIMIN 550 MG/1
1 TABLET TABLET ORAL TWICE A DAY
Qty: 180 TABLET | Refills: 3 | OUTPATIENT
Start: 2022-03-07 | End: 2023-03-02

## 2022-03-07 RX ORDER — PROMETHAZINE HYDROCHLORIDE 25 MG/1
TABLET ORAL
Qty: 0 | Refills: 0 | COMMUNITY
Start: 1900-01-01

## 2022-03-07 RX ORDER — METFORMIN HYDROCHLORIDE 500 MG/1
TAKE 1 TABLET (500 MG) BY ORAL ROUTE 2 TIMES PER DAY WITH MORNING AND EVENING MEALS TABLET, COATED ORAL 2
Qty: 0 | Refills: 0 | COMMUNITY
Start: 1900-01-01

## 2022-03-07 NOTE — HPI-OTHER HISTORIES
History Of Present Illness    4/2019 Ms. 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 She is a previous patient of Dr. Abdalla and she has been seeing a hepatologist at [place] every 6 months. She has been getting MRI's every 6 months. Her last MRI was in January with dysplastic appearing nodules. These have been stable. EGD 2018 with trace varices, retained food in her stomach, and portal HTN. She is taking protonix 40mg BID and zantac. Her reflux well controlled. She denies any nausea or vomiting. She denies any ascites. She denies any free bleeding. She had mental changes with being forgetful and sedated in the morning. She had been taking Ambien. This is resolved. She is not taking lactulose.  She has a history of colon cancer in 2011 with resection and chemo. Last colonoscopy was 2018 and normal except for diverticulosis. She is having diarrhea every meal. Imodium is not helping.  CS  6/17/2019 Ms. Correa is here for f/u of Cirrhosis. She is well compensated. She had an MRI with stable dysplastic appearing nodules. Her reflux is well controleld on Protonix 40mg BID. She on atenolol for trace varices. She denies any increase in bleeding or ascites. She has some mild confusion thought to be med related. She was having diarrhea and given colestipol. This made her sick. She is now having constipation. CS  September 23, 2019 Ms. Dunaway is here for f/u of Cirrhosis. Her MRI ni June was stable and due in December. She denies any reflux since taking protonix 40mg BID. She denies any increase in bleeding. She has a sore on her shin that has been present for several months. She saw her PCP with a bx. She has had some all over swelling. Her PCP started her on a diuretic. She has been taking this for a month. Her potassium was a little low in June and has not been rechecked. She was having minor confusion at her last OV. She denies any further issues. She has constipation to diarrhea. She is not taking anything at this time. She is having some LLQ pain after eating. She was given ibuprofen by her PCP. She was given thyroid medication at her last PCP OV. She is not taking this.  She has had some numbness and tingling in her hands. She was told this was coming from her neck. She is seeing a surgeon next month. CS  February 24, 2020 Ms. Correa is here for f/u of Cirrhosis. She had a MRI in December that was stable. Her reflux is well controlled with PPI daily. She has mild breakthrough if she eats late. She denies any increase in bleeding. She denies any abdominal swelling. Her bowels are now normal. She does not need the colestipol or lactulose. She is having surgery next month on her neck. She overall is feeling well. She has not followed up with her PCP regarding her thyroid. CS   10/7/2020 Ms. Correa is here for f/u of Cirrhosis. She had surgery on her neck since her last OV. She is back on pain medication. She is starting to feel better. She is having some worsening reflux with food coming back up when she lays down. She is having some nausea. She is having some RUQ pain intermittently. She is due for MRI for HCC and surveillance of a dysplastic nodule. She denies any swelling, increase in bleeding, or mental changes. Her bowels are currently normal without lactulose. CS   11/18/2020 Ms. Correa is here for f/u of Cirrhosis. She had surgery on her neck and was started on pain medication. This made her reflux worse. She was started on pepcid. She was unable to take this because she felt it made her swell. She is no longer having this symptoms. She does however had a lot of bloating and gas. She is no longer taking lactulose and having some constipation. She had her MRI 11/12 for HCC and surveillance of a dysplastic nodule this was stable. She denies any swelling, increase in bleeding, or mental changes. She is due for colonoscopy next year. CS   2/25/2021 Ms. Correa is here for f/u of Cirrhosis and colon cancer surgery. She is out of her pain medication today and having a lot of nerve pain down her legs. This makes it hard to walk at times. Her reflux is well controlled. Her bowels are more normal without lactulose or colestipol. if she gets backed up she will take lactulose with relief. She denies any ascites. She denies any bleeding. She denies any mental changes. She had her MRI 11/12 for HCC and surveillance of a dysplastic nodule this was stable. Overall, she is feeling well. CS   5/26/2021 Ms. Correa is here for f/u of Cirrhosis and colonoscopy. She continues to have pain but this is fairly well controlled with pain medication. She tries to limit how much she takes. She does have some minor fatigue from this. She denies any confusion or sedation. Her reflux is well controlled. Her bowels are more normal with lactulose prn. She has bloating at times but not too bothersome.  She denies any ascites. She denies any bleeding. She denies any mental changes. She had her MRI 11/12 for HCC and surveillance of a dysplastic nodule this was stable. She is due for repeat. Her repeat colonoscopy showed a 4mm inflammatory polyp in the rectum. Overall, she is feeling well. CS 0  10/4/2021 Ms. Correa is here for f/u of Cirrhosis. Since her last OV, she fell and fractured her patella and bruised her right eye. She was taking to the ER and had a negative head CT. She is making a little trouble getting around with her knee brace but doing ok. She denies any ascites. She has occassional fluid build up everywhere that her PCP has given  her lasix and potassium. She denies any constipation at this time. She will take lactulose if this happens and it works well. She denies any mental changes. She denies is bleeding. Her MRI this summer was negative for HCC and due in December. CS

## 2022-03-07 NOTE — HPI-TODAY'S VISIT:
3/7/2022 Ms. Correa is here for f/u of Cirrhosis. She had a MRI last month negative for ascites and HCC. She has been having some increase in bloating after eating and increase in gas. She denies any constipation at this time and has been having more looser urgent stools. She is not taking the lactulose. She denies any mental changes. She denies is bleeding. Her  is having some back issues and having testing.  CS

## 2022-03-08 ENCOUNTER — TELEPHONE ENCOUNTER (OUTPATIENT)
Dept: URBAN - NONMETROPOLITAN AREA CLINIC 2 | Facility: CLINIC | Age: 62
End: 2022-03-08

## 2022-03-08 LAB
AFP, SERUM, TUMOR MARKER: 16.3
ALBUMIN: 4.4
ALKALINE PHOSPHATASE: 137
ALT (SGPT): 17
AST (SGOT): 25
BILIRUBIN, DIRECT: 0.26
BILIRUBIN, TOTAL: 0.7
BUN/CREATININE RATIO: 21
BUN: 13
CARBON DIOXIDE, TOTAL: 26
CHLORIDE: 94
CREATININE: 0.62
EGFR: 101
GLUCOSE: 65
HEMATOCRIT: 46.1
HEMOGLOBIN: 15.3
INR: 1.1
MCH: 29.2
MCHC: 33.2
MCV: 88
NRBC: (no result)
PLATELETS: 136
POTASSIUM: 3.6
PROTEIN, TOTAL: 7.7
PROTHROMBIN TIME: 11.2
RBC: 5.24
RDW: 13.7
SODIUM: 139
WBC: 9.4

## 2022-06-08 ENCOUNTER — OFFICE VISIT (OUTPATIENT)
Dept: URBAN - NONMETROPOLITAN AREA CLINIC 2 | Facility: CLINIC | Age: 62
End: 2022-06-08

## 2022-06-08 RX ORDER — RIFAXIMIN 550 MG/1
1 TABLET TABLET ORAL TWICE A DAY
Qty: 180 TABLET | Refills: 3 | COMMUNITY
Start: 2022-03-07 | End: 2023-03-02

## 2022-06-08 RX ORDER — LACTULOSE 10 G/15ML
45 ML TID AS NEEDED FOR CONSTIPATION SOLUTION ORAL TID
Qty: 4050 ML | Refills: 6 | OUTPATIENT

## 2022-06-08 RX ORDER — DIAZEPAM 2 MG/1
TAKE 1 TABLET BY ORAL ROUTE 30 MINS PRIOR TO MRI TABLET ORAL 2
Qty: 1 | Refills: 0

## 2022-06-08 RX ORDER — RIFAXIMIN 550 MG/1
1 TABLET TABLET ORAL TWICE A DAY
Qty: 180 TABLET | Refills: 3 | OUTPATIENT

## 2022-06-08 RX ORDER — ZOLPIDEM TARTRATE 10 MG/1
TAKE 1 TABLET (10 MG) BY ORAL ROUTE ONCE DAILY AT BEDTIME TABLET, FILM COATED ORAL 1
Qty: 0 | Refills: 0 | COMMUNITY
Start: 1900-01-01

## 2022-06-08 RX ORDER — PROMETHAZINE HYDROCHLORIDE 25 MG/1
TABLET ORAL
Qty: 0 | Refills: 0 | COMMUNITY
Start: 1900-01-01

## 2022-06-08 RX ORDER — FAMOTIDINE 40 MG/1
TAKE 1 TABLET BY ORAL ROUTE AT BEDTIME FOR 90 DAYS TABLET, FILM COATED ORAL ONCE A DAY
Qty: 90 TABLET | Refills: 3

## 2022-06-08 RX ORDER — TIZANIDINE HYDROCHLORIDE 4 MG/1
CAPSULE ORAL
Qty: 0 | Refills: 0 | COMMUNITY
Start: 1900-01-01

## 2022-06-08 RX ORDER — ATENOLOL 50 MG/1
TAKE 1 TABLET (50 MG) BY ORAL ROUTE 2 TIMES PER DAY TABLET ORAL 2
Qty: 0 | Refills: 0 | COMMUNITY
Start: 1900-01-01

## 2022-06-08 RX ORDER — OXYCODONE HYDROCHLORIDE 5 MG/1
CAPSULE ORAL
Qty: 0 | Refills: 0 | COMMUNITY
Start: 1900-01-01

## 2022-06-08 RX ORDER — LACTULOSE 10 G/15ML
45 ML TID AS NEEDED FOR CONSTIPATION SOLUTION ORAL TID
Qty: 4050 ML | Refills: 6 | COMMUNITY

## 2022-06-08 RX ORDER — DULOXETINE 60 MG/1
TAKE 2 CAPSULES (120 MG) BY ORAL ROUTE ONCE DAILY CAPSULE, DELAYED RELEASE PELLETS ORAL 1
Qty: 0 | Refills: 0 | COMMUNITY
Start: 1900-01-01

## 2022-06-08 RX ORDER — DIAZEPAM 2 MG/1
TAKE 1 TABLET BY ORAL ROUTE 30 MINS PRIOR TO MRI TABLET ORAL 2
Qty: 1 | Refills: 0 | COMMUNITY

## 2022-06-08 RX ORDER — FAMOTIDINE 40 MG/1
TAKE 1 TABLET BY ORAL ROUTE AT BEDTIME FOR 90 DAYS TABLET, FILM COATED ORAL ONCE A DAY
Qty: 90 TABLET | Refills: 3 | COMMUNITY

## 2022-06-08 RX ORDER — METFORMIN HYDROCHLORIDE 500 MG/1
TAKE 1 TABLET (500 MG) BY ORAL ROUTE 2 TIMES PER DAY WITH MORNING AND EVENING MEALS TABLET, COATED ORAL 2
Qty: 0 | Refills: 0 | COMMUNITY
Start: 1900-01-01

## 2022-06-08 RX ORDER — ROPINIROLE HYDROCHLORIDE 0.25 MG/1
TAKE 1 TABLET (0.25 MG) BY ORAL ROUTE 1-3 HOURS BEFORE BEDTIME TABLET, FILM COATED ORAL
Qty: 1 | Refills: 0 | COMMUNITY
Start: 1900-01-01

## 2022-06-08 NOTE — HPI-OTHER HISTORIES
History Of Present Illness    4/2019 Ms. 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 She is a previous patient of Dr. Abdalla and she has been seeing a hepatologist at [place] every 6 months. She has been getting MRI's every 6 months. Her last MRI was in January with dysplastic appearing nodules. These have been stable. EGD 2018 with trace varices, retained food in her stomach, and portal HTN. She is taking protonix 40mg BID and zantac. Her reflux well controlled. She denies any nausea or vomiting. She denies any ascites. She denies any free bleeding. She had mental changes with being forgetful and sedated in the morning. She had been taking Ambien. This is resolved. She is not taking lactulose.  She has a history of colon cancer in 2011 with resection and chemo. Last colonoscopy was 2018 and normal except for diverticulosis. She is having diarrhea every meal. Imodium is not helping.  CS  6/17/2019 Ms. Correa is here for f/u of Cirrhosis. She is well compensated. She had an MRI with stable dysplastic appearing nodules. Her reflux is well controleld on Protonix 40mg BID. She on atenolol for trace varices. She denies any increase in bleeding or ascites. She has some mild confusion thought to be med related. She was having diarrhea and given colestipol. This made her sick. She is now having constipation. CS  September 23, 2019 Ms. Dunaway is here for f/u of Cirrhosis. Her MRI ni June was stable and due in December. She denies any reflux since taking protonix 40mg BID. She denies any increase in bleeding. She has a sore on her shin that has been present for several months. She saw her PCP with a bx. She has had some all over swelling. Her PCP started her on a diuretic. She has been taking this for a month. Her potassium was a little low in June and has not been rechecked. She was having minor confusion at her last OV. She denies any further issues. She has constipation to diarrhea. She is not taking anything at this time. She is having some LLQ pain after eating. She was given ibuprofen by her PCP. She was given thyroid medication at her last PCP OV. She is not taking this.  She has had some numbness and tingling in her hands. She was told this was coming from her neck. She is seeing a surgeon next month. CS  February 24, 2020 Ms. Correa is here for f/u of Cirrhosis. She had a MRI in December that was stable. Her reflux is well controlled with PPI daily. She has mild breakthrough if she eats late. She denies any increase in bleeding. She denies any abdominal swelling. Her bowels are now normal. She does not need the colestipol or lactulose. She is having surgery next month on her neck. She overall is feeling well. She has not followed up with her PCP regarding her thyroid. CS   10/7/2020 Ms. Correa is here for f/u of Cirrhosis. She had surgery on her neck since her last OV. She is back on pain medication. She is starting to feel better. She is having some worsening reflux with food coming back up when she lays down. She is having some nausea. She is having some RUQ pain intermittently. She is due for MRI for HCC and surveillance of a dysplastic nodule. She denies any swelling, increase in bleeding, or mental changes. Her bowels are currently normal without lactulose. CS   11/18/2020 Ms. Correa is here for f/u of Cirrhosis. She had surgery on her neck and was started on pain medication. This made her reflux worse. She was started on pepcid. She was unable to take this because she felt it made her swell. She is no longer having this symptoms. She does however had a lot of bloating and gas. She is no longer taking lactulose and having some constipation. She had her MRI 11/12 for HCC and surveillance of a dysplastic nodule this was stable. She denies any swelling, increase in bleeding, or mental changes. She is due for colonoscopy next year. CS   2/25/2021 Ms. Correa is here for f/u of Cirrhosis and colon cancer surgery. She is out of her pain medication today and having a lot of nerve pain down her legs. This makes it hard to walk at times. Her reflux is well controlled. Her bowels are more normal without lactulose or colestipol. if she gets backed up she will take lactulose with relief. She denies any ascites. She denies any bleeding. She denies any mental changes. She had her MRI 11/12 for HCC and surveillance of a dysplastic nodule this was stable. Overall, she is feeling well. CS   5/26/2021 Ms. Correa is here for f/u of Cirrhosis and colonoscopy. She continues to have pain but this is fairly well controlled with pain medication. She tries to limit how much she takes. She does have some minor fatigue from this. She denies any confusion or sedation. Her reflux is well controlled. Her bowels are more normal with lactulose prn. She has bloating at times but not too bothersome.  She denies any ascites. She denies any bleeding. She denies any mental changes. She had her MRI 11/12 for HCC and surveillance of a dysplastic nodule this was stable. She is due for repeat. Her repeat colonoscopy showed a 4mm inflammatory polyp in the rectum. Overall, she is feeling well. CS 0  10/4/2021 Ms. Correa is here for f/u of Cirrhosis. Since her last OV, she fell and fractured her patella and bruised her right eye. She was taking to the ER and had a negative head CT. She is making a little trouble getting around with her knee brace but doing ok. She denies any ascites. She has occassional fluid build up everywhere that her PCP has given  her lasix and potassium. She denies any constipation at this time. She will take lactulose if this happens and it works well. She denies any mental changes. She denies is bleeding. Her MRI this summer was negative for HCC and due in December. CS   3/7/2022 Ms. Correa is here for f/u of Cirrhosis. She had a MRI last month negative for ascites and HCC. She has been having some increase in bloating after eating and increase in gas. She denies any constipation at this time and has been having more looser urgent stools. She is not taking the lactulose. She denies any mental changes. She denies is bleeding. Her  is having some back issues and having testing.  CS

## 2022-06-08 NOTE — HPI-TODAY'S VISIT:
6/8/2022 Ms. Correa is here for f/u of Cirrhosis. She had a MRI last month negative for ascites and HCC. She has been having some increase in bloating after eating and increase in gas. She denies any constipation at this time and has been having more looser urgent stools. She is not taking the lactulose. She denies any mental changes. She denies is bleeding. Her  is having some back issues and having testing.  CS

## 2022-08-31 ENCOUNTER — OFFICE VISIT (OUTPATIENT)
Dept: URBAN - NONMETROPOLITAN AREA CLINIC 2 | Facility: CLINIC | Age: 62
End: 2022-08-31
Payer: COMMERCIAL

## 2022-08-31 ENCOUNTER — WEB ENCOUNTER (OUTPATIENT)
Dept: URBAN - NONMETROPOLITAN AREA CLINIC 2 | Facility: CLINIC | Age: 62
End: 2022-08-31

## 2022-08-31 ENCOUNTER — LAB OUTSIDE AN ENCOUNTER (OUTPATIENT)
Dept: URBAN - NONMETROPOLITAN AREA CLINIC 2 | Facility: CLINIC | Age: 62
End: 2022-08-31

## 2022-08-31 VITALS
SYSTOLIC BLOOD PRESSURE: 158 MMHG | BODY MASS INDEX: 32.82 KG/M2 | HEIGHT: 65 IN | HEART RATE: 67 BPM | WEIGHT: 197 LBS | DIASTOLIC BLOOD PRESSURE: 88 MMHG

## 2022-08-31 DIAGNOSIS — K74.60 CIRRHOSIS: ICD-10-CM

## 2022-08-31 DIAGNOSIS — R10.84 ABDOMINAL CRAMPING, GENERALIZED: ICD-10-CM

## 2022-08-31 DIAGNOSIS — R10.9 ABDOMINAL PAIN: ICD-10-CM

## 2022-08-31 DIAGNOSIS — K76.89: ICD-10-CM

## 2022-08-31 DIAGNOSIS — K21.9 GERD (GASTROESOPHAGEAL REFLUX DISEASE): ICD-10-CM

## 2022-08-31 DIAGNOSIS — R19.4 BOWEL HABIT CHANGES: ICD-10-CM

## 2022-08-31 DIAGNOSIS — Z85.038 PERSONAL HISTORY OF COLON CANCER: ICD-10-CM

## 2022-08-31 PROCEDURE — 99214 OFFICE O/P EST MOD 30 MIN: CPT | Performed by: NURSE PRACTITIONER

## 2022-08-31 RX ORDER — DIAZEPAM 2 MG/1
TAKE 1 TABLET BY ORAL ROUTE 30 MINS PRIOR TO MRI TABLET ORAL 2
Qty: 1 | Refills: 0 | Status: ACTIVE | COMMUNITY

## 2022-08-31 RX ORDER — ATENOLOL 50 MG/1
TAKE 1 TABLET (50 MG) BY ORAL ROUTE 2 TIMES PER DAY TABLET ORAL 2
Qty: 0 | Refills: 0 | COMMUNITY
Start: 1900-01-01

## 2022-08-31 RX ORDER — PROMETHAZINE HYDROCHLORIDE 25 MG/1
TABLET ORAL
Qty: 0 | Refills: 0 | COMMUNITY
Start: 1900-01-01

## 2022-08-31 RX ORDER — ZOLPIDEM TARTRATE 10 MG/1
TAKE 1 TABLET (10 MG) BY ORAL ROUTE ONCE DAILY AT BEDTIME TABLET, FILM COATED ORAL 1
Qty: 0 | Refills: 0 | COMMUNITY
Start: 1900-01-01

## 2022-08-31 RX ORDER — OXYCODONE HYDROCHLORIDE 5 MG/1
CAPSULE ORAL
Qty: 0 | Refills: 0 | COMMUNITY
Start: 1900-01-01

## 2022-08-31 RX ORDER — ROPINIROLE HYDROCHLORIDE 0.25 MG/1
TAKE 1 TABLET (0.25 MG) BY ORAL ROUTE 1-3 HOURS BEFORE BEDTIME TABLET, FILM COATED ORAL
Qty: 1 | Refills: 0 | COMMUNITY
Start: 1900-01-01

## 2022-08-31 RX ORDER — METFORMIN HYDROCHLORIDE 500 MG/1
TAKE 1 TABLET (500 MG) BY ORAL ROUTE 2 TIMES PER DAY WITH MORNING AND EVENING MEALS TABLET, COATED ORAL 2
Qty: 0 | Refills: 0 | COMMUNITY
Start: 1900-01-01

## 2022-08-31 RX ORDER — FAMOTIDINE 40 MG/1
TAKE 1 TABLET BY ORAL ROUTE AT BEDTIME FOR 90 DAYS TABLET, FILM COATED ORAL ONCE A DAY
Qty: 90 TABLET | Refills: 3 | Status: ACTIVE | COMMUNITY

## 2022-08-31 RX ORDER — RIFAXIMIN 550 MG/1
1 TABLET TABLET ORAL TWICE A DAY
Qty: 180 TABLET | Refills: 3 | OUTPATIENT

## 2022-08-31 RX ORDER — LACTULOSE 10 G/15ML
45 ML TID AS NEEDED FOR CONSTIPATION SOLUTION ORAL TID
Qty: 4050 ML | Refills: 6 | OUTPATIENT

## 2022-08-31 RX ORDER — FAMOTIDINE 40 MG/1
TAKE 1 TABLET BY ORAL ROUTE AT BEDTIME FOR 90 DAYS TABLET, FILM COATED ORAL ONCE A DAY
Qty: 90 TABLET | Refills: 3

## 2022-08-31 RX ORDER — DIAZEPAM 2 MG/1
TAKE 1 TABLET BY ORAL ROUTE 30 MINS PRIOR TO MRI TABLET ORAL 2
Qty: 1 | Refills: 0

## 2022-08-31 RX ORDER — TIZANIDINE HYDROCHLORIDE 4 MG/1
CAPSULE ORAL
Qty: 0 | Refills: 0 | COMMUNITY
Start: 1900-01-01

## 2022-08-31 RX ORDER — RIFAXIMIN 550 MG/1
1 TABLET TABLET ORAL TWICE A DAY
Qty: 180 TABLET | Refills: 3 | Status: ACTIVE | COMMUNITY

## 2022-08-31 RX ORDER — DULOXETINE 60 MG/1
TAKE 2 CAPSULES (120 MG) BY ORAL ROUTE ONCE DAILY CAPSULE, DELAYED RELEASE PELLETS ORAL 1
Qty: 0 | Refills: 0 | COMMUNITY
Start: 1900-01-01

## 2022-08-31 RX ORDER — LACTULOSE 10 G/15ML
45 ML TID AS NEEDED FOR CONSTIPATION SOLUTION ORAL TID
Qty: 4050 ML | Refills: 6 | Status: ACTIVE | COMMUNITY

## 2022-08-31 NOTE — HPI-OTHER HISTORIES
History Of Present Illness    4/2019 Ms. 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 She is a previous patient of Dr. Abdalla and she has been seeing a hepatologist at [place] every 6 months. She has been getting MRI's every 6 months. Her last MRI was in January with dysplastic appearing nodules. These have been stable. EGD 2018 with trace varices, retained food in her stomach, and portal HTN. She is taking protonix 40mg BID and zantac. Her reflux well controlled. She denies any nausea or vomiting. She denies any ascites. She denies any free bleeding. She had mental changes with being forgetful and sedated in the morning. She had been taking Ambien. This is resolved. She is not taking lactulose.  She has a history of colon cancer in 2011 with resection and chemo. Last colonoscopy was 2018 and normal except for diverticulosis. She is having diarrhea every meal. Imodium is not helping.  CS  6/17/2019 Ms. Correa is here for f/u of Cirrhosis. She is well compensated. She had an MRI with stable dysplastic appearing nodules. Her reflux is well controleld on Protonix 40mg BID. She on atenolol for trace varices. She denies any increase in bleeding or ascites. She has some mild confusion thought to be med related. She was having diarrhea and given colestipol. This made her sick. She is now having constipation. CS  September 23, 2019 Ms. Dunaway is here for f/u of Cirrhosis. Her MRI ni June was stable and due in December. She denies any reflux since taking protonix 40mg BID. She denies any increase in bleeding. She has a sore on her shin that has been present for several months. She saw her PCP with a bx. She has had some all over swelling. Her PCP started her on a diuretic. She has been taking this for a month. Her potassium was a little low in June and has not been rechecked. She was having minor confusion at her last OV. She denies any further issues. She has constipation to diarrhea. She is not taking anything at this time. She is having some LLQ pain after eating. She was given ibuprofen by her PCP. She was given thyroid medication at her last PCP OV. She is not taking this.  She has had some numbness and tingling in her hands. She was told this was coming from her neck. She is seeing a surgeon next month. CS  February 24, 2020 Ms. Correa is here for f/u of Cirrhosis. She had a MRI in December that was stable. Her reflux is well controlled with PPI daily. She has mild breakthrough if she eats late. She denies any increase in bleeding. She denies any abdominal swelling. Her bowels are now normal. She does not need the colestipol or lactulose. She is having surgery next month on her neck. She overall is feeling well. She has not followed up with her PCP regarding her thyroid. CS   10/7/2020 Ms. Correa is here for f/u of Cirrhosis. She had surgery on her neck since her last OV. She is back on pain medication. She is starting to feel better. She is having some worsening reflux with food coming back up when she lays down. She is having some nausea. She is having some RUQ pain intermittently. She is due for MRI for HCC and surveillance of a dysplastic nodule. She denies any swelling, increase in bleeding, or mental changes. Her bowels are currently normal without lactulose. CS   11/18/2020 Ms. Correa is here for f/u of Cirrhosis. She had surgery on her neck and was started on pain medication. This made her reflux worse. She was started on pepcid. She was unable to take this because she felt it made her swell. She is no longer having this symptoms. She does however had a lot of bloating and gas. She is no longer taking lactulose and having some constipation. She had her MRI 11/12 for HCC and surveillance of a dysplastic nodule this was stable. She denies any swelling, increase in bleeding, or mental changes. She is due for colonoscopy next year. CS   2/25/2021 Ms. Correa is here for f/u of Cirrhosis and colon cancer surgery. She is out of her pain medication today and having a lot of nerve pain down her legs. This makes it hard to walk at times. Her reflux is well controlled. Her bowels are more normal without lactulose or colestipol. if she gets backed up she will take lactulose with relief. She denies any ascites. She denies any bleeding. She denies any mental changes. She had her MRI 11/12 for HCC and surveillance of a dysplastic nodule this was stable. Overall, she is feeling well. CS   5/26/2021 Ms. Correa is here for f/u of Cirrhosis and colonoscopy. She continues to have pain but this is fairly well controlled with pain medication. She tries to limit how much she takes. She does have some minor fatigue from this. She denies any confusion or sedation. Her reflux is well controlled. Her bowels are more normal with lactulose prn. She has bloating at times but not too bothersome.  She denies any ascites. She denies any bleeding. She denies any mental changes. She had her MRI 11/12 for HCC and surveillance of a dysplastic nodule this was stable. She is due for repeat. Her repeat colonoscopy showed a 4mm inflammatory polyp in the rectum. Overall, she is feeling well. CS 0  10/4/2021 Ms. Correa is here for f/u of Cirrhosis. Since her last OV, she fell and fractured her patella and bruised her right eye. She was taking to the ER and had a negative head CT. She is making a little trouble getting around with her knee brace but doing ok. She denies any ascites. She has occassional fluid build up everywhere that her PCP has given  her lasix and potassium. She denies any constipation at this time. She will take lactulose if this happens and it works well. She denies any mental changes. She denies is bleeding. Her MRI this summer was negative for HCC and due in December. CS   3/7/2022 Ms. Correa is here for f/u of Cirrhosis. She had a MRI last month negative for ascites and HCC. She has been having some increase in bloating after eating and increase in gas. She denies any constipation at this time and has been having more looser urgent stools. She is not taking the lactulose. She denies any mental changes. She denies is bleeding. Her  is having some back issues and having testing.  CS  6/8/2022 Ms. Correa is here for f/u of Cirrhosis. She had a MRI last month negative for ascites and HCC. She has been having some increase in bloating after eating and increase in gas. She denies any constipation at this time and has been having more looser urgent stools. She is not taking the lactulose. She denies any mental changes. She denies is bleeding. Her  is having some back issues and having testing.  CS

## 2022-08-31 NOTE — HPI-TODAY'S VISIT:
8/31/2022 Ms. Correa is here for f/u of Cirrhosis. She had a MRI 2/2022 negative for ascites and HCC. She has been having RUQ pain after eating. This has gotten worse since her last OV. She has also had more nerve issues and on gabapentin. She is not taking it as rx.  She denies any constipation at this time. She was having some looser stool. She is now on xifaxan and this is helping. She denies any mental change. She is not taking the lactulose. She denies is bleeding. CS

## 2022-09-01 LAB
AFP, SERUM, TUMOR MARKER: 16.8
ALBUMIN/GLOBULIN RATIO: 1.2
ALBUMIN: 4.4
ALKALINE PHOSPHATASE: 164
ALT (SGPT): 28
AST (SGOT): 29
BILIRUBIN, DIRECT: 0.3
BILIRUBIN, INDIRECT: 0.9
BILIRUBIN, TOTAL: 1.2
BUN/CREATININE RATIO: (no result)
CALCIUM: 10
CARBON DIOXIDE: 32
CHLORIDE: 96
CREATININE: 0.72
EGFR: 94
GLOBULIN: 3.6
GLUCOSE: 90
HEMATOCRIT: 46.3
HEMOGLOBIN: 16
INR: 1
MCH: 29
MCHC: 34.6
MCV: 83.9
MPV: 10.4
PLATELET COUNT: 213
POTASSIUM: 3.9
PROTEIN, TOTAL: 8
PT: 10.7
RDW: 14
RED BLOOD CELL COUNT: 5.52
SODIUM: 141
UREA NITROGEN (BUN): 19
WHITE BLOOD CELL COUNT: 12.4

## 2022-09-22 ENCOUNTER — P2P PATIENT RECORD (OUTPATIENT)
Age: 62
End: 2022-09-22

## 2022-10-19 ENCOUNTER — TELEPHONE ENCOUNTER (OUTPATIENT)
Dept: URBAN - NONMETROPOLITAN AREA CLINIC 2 | Facility: CLINIC | Age: 62
End: 2022-10-19

## 2022-11-18 ENCOUNTER — LAB OUTSIDE AN ENCOUNTER (OUTPATIENT)
Dept: URBAN - NONMETROPOLITAN AREA CLINIC 2 | Facility: CLINIC | Age: 62
End: 2022-11-18

## 2022-11-18 ENCOUNTER — OFFICE VISIT (OUTPATIENT)
Dept: URBAN - METROPOLITAN AREA MEDICAL CENTER 1 | Facility: MEDICAL CENTER | Age: 62
End: 2022-11-18
Payer: COMMERCIAL

## 2022-11-18 DIAGNOSIS — I85.10 ESOPH VARICE OTHER DIS: ICD-10-CM

## 2022-11-18 DIAGNOSIS — K31.89 ACQUIRED DEFORMITY OF DUODENUM: ICD-10-CM

## 2022-11-18 DIAGNOSIS — K74.69 CIRRHOSIS, CRYPTOGENIC: ICD-10-CM

## 2022-11-18 PROCEDURE — 43244 EGD VARICES LIGATION: CPT | Performed by: INTERNAL MEDICINE

## 2022-11-18 PROCEDURE — 43239 EGD BIOPSY SINGLE/MULTIPLE: CPT | Performed by: INTERNAL MEDICINE

## 2022-11-21 LAB
AP CASE REPORT: (no result)
AP FINAL DIAGNOSIS: (no result)
AP GROSS DESCRIPTION: (no result)
AP MICROSCOPIC DESCRIPTION: (no result)

## 2023-01-11 ENCOUNTER — OFFICE VISIT (OUTPATIENT)
Dept: URBAN - NONMETROPOLITAN AREA CLINIC 13 | Facility: CLINIC | Age: 63
End: 2023-01-11
Payer: COMMERCIAL

## 2023-01-11 ENCOUNTER — LAB OUTSIDE AN ENCOUNTER (OUTPATIENT)
Dept: URBAN - NONMETROPOLITAN AREA CLINIC 2 | Facility: CLINIC | Age: 63
End: 2023-01-11

## 2023-01-11 ENCOUNTER — LAB OUTSIDE AN ENCOUNTER (OUTPATIENT)
Dept: URBAN - NONMETROPOLITAN AREA CLINIC 13 | Facility: CLINIC | Age: 63
End: 2023-01-11

## 2023-01-11 VITALS
TEMPERATURE: 98.1 F | HEIGHT: 65 IN | WEIGHT: 197.4 LBS | BODY MASS INDEX: 32.89 KG/M2 | HEART RATE: 60 BPM | DIASTOLIC BLOOD PRESSURE: 84 MMHG | SYSTOLIC BLOOD PRESSURE: 162 MMHG

## 2023-01-11 DIAGNOSIS — Z85.038 PERSONAL HISTORY OF COLON CANCER: ICD-10-CM

## 2023-01-11 DIAGNOSIS — I85.00 ESOPHAGEAL VARICES DETERMINED BY ENDOSCOPY: ICD-10-CM

## 2023-01-11 DIAGNOSIS — K76.89: ICD-10-CM

## 2023-01-11 DIAGNOSIS — R10.9 ABDOMINAL PAIN: ICD-10-CM

## 2023-01-11 DIAGNOSIS — R19.4 BOWEL HABIT CHANGES: ICD-10-CM

## 2023-01-11 DIAGNOSIS — K21.9 GERD (GASTROESOPHAGEAL REFLUX DISEASE): ICD-10-CM

## 2023-01-11 DIAGNOSIS — K74.60 CIRRHOSIS: ICD-10-CM

## 2023-01-11 LAB
A/G RATIO: 0.9
ALBUMIN: 3.9
ALKALINE PHOSPHATASE: 117
ALT (SGPT): 16
ANION GAP: 18
AST (SGOT): 30
BILIRUBIN DIRECT: 0.15
BILIRUBIN TOTAL: 0.6
BILIRUBIN, INDIRECT: 0.5
BLOOD UREA NITROGEN: 7
BUN / CREAT RATIO: 11
BUN / CREAT RATIO: 11
CALCIUM: 10.4
CALCIUM: 10.4
CHLORIDE: 99
CO2: 29
CREATININE, SERUM: 0.61
EGFR (CKD-EPI): >60
GLUCOSE: 74
HEMATOCRIT: 40.7
HEMOGLOBIN: 13.2
INR: 1.32
MEAN CORPUSCULAR HEMOGLOBIN CONC: 32.5
MEAN CORPUSCULAR HEMOGLOBIN: 27
MEAN CORPUSCULAR VOLUME: 83.1
MEAN PLATELET VOLUME: 7.6
PLATELET COUNT: 237
POTASSIUM: 4.4
PROTEIN TOTAL: 8.3
PROTHROMBIN TIME: 14.8
RED BLOOD CELL COUNT: 4.9
RED CELL DISTRIBUTION WIDTH: 16.2
SODIUM: 142
WHITE BLOOD CELL COUNT: 7.3

## 2023-01-11 PROCEDURE — 99214 OFFICE O/P EST MOD 30 MIN: CPT | Performed by: NURSE PRACTITIONER

## 2023-01-11 RX ORDER — LACTULOSE 10 G/15ML
45 ML TID AS NEEDED FOR CONSTIPATION SOLUTION ORAL TID
Qty: 4050 ML | Refills: 6 | Status: ACTIVE | COMMUNITY

## 2023-01-11 RX ORDER — OXYCODONE HYDROCHLORIDE 5 MG/1
CAPSULE ORAL
Qty: 0 | Refills: 0 | COMMUNITY
Start: 1900-01-01

## 2023-01-11 RX ORDER — TIZANIDINE HYDROCHLORIDE 4 MG/1
CAPSULE ORAL
Qty: 0 | Refills: 0 | COMMUNITY
Start: 1900-01-01

## 2023-01-11 RX ORDER — COLESTIPOL HYDROCHLORIDE 1 G/1
1 TABLET 30MINS PRIOR TO A MEAL TABLET, FILM COATED ORAL ONCE A DAY
Qty: 30 TABLET | Refills: 6 | OUTPATIENT
Start: 2023-01-11

## 2023-01-11 RX ORDER — RIFAXIMIN 550 MG/1
1 TABLET TABLET ORAL TWICE A DAY
Qty: 180 TABLET | Refills: 3 | OUTPATIENT

## 2023-01-11 RX ORDER — DIAZEPAM 2 MG/1
TAKE 1 TABLET BY ORAL ROUTE 30 MINS PRIOR TO MRI TABLET ORAL 2
Qty: 1 | Refills: 0 | Status: ACTIVE | COMMUNITY

## 2023-01-11 RX ORDER — DIAZEPAM 2 MG/1
TAKE 1 TABLET BY ORAL ROUTE 30 MINS PRIOR TO MRI TABLET ORAL 2
Qty: 1 | Refills: 0

## 2023-01-11 RX ORDER — FAMOTIDINE 40 MG/1
TAKE 1 TABLET BY ORAL ROUTE AT BEDTIME FOR 90 DAYS TABLET, FILM COATED ORAL ONCE A DAY
Qty: 90 TABLET | Refills: 3 | Status: ACTIVE | COMMUNITY

## 2023-01-11 RX ORDER — RIFAXIMIN 550 MG/1
1 TABLET TABLET ORAL TWICE A DAY
Qty: 180 TABLET | Refills: 3 | Status: ACTIVE | COMMUNITY

## 2023-01-11 RX ORDER — ZOLPIDEM TARTRATE 10 MG/1
TAKE 1 TABLET (10 MG) BY ORAL ROUTE ONCE DAILY AT BEDTIME TABLET, FILM COATED ORAL 1
Qty: 0 | Refills: 0 | COMMUNITY
Start: 1900-01-01

## 2023-01-11 RX ORDER — METFORMIN HYDROCHLORIDE 500 MG/1
TAKE 1 TABLET (500 MG) BY ORAL ROUTE 2 TIMES PER DAY WITH MORNING AND EVENING MEALS TABLET, COATED ORAL 2
Qty: 0 | Refills: 0 | COMMUNITY
Start: 1900-01-01

## 2023-01-11 RX ORDER — DULOXETINE 60 MG/1
TAKE 2 CAPSULES (120 MG) BY ORAL ROUTE ONCE DAILY CAPSULE, DELAYED RELEASE PELLETS ORAL 1
Qty: 0 | Refills: 0 | COMMUNITY
Start: 1900-01-01

## 2023-01-11 RX ORDER — ATENOLOL 50 MG/1
TAKE 1 TABLET (50 MG) BY ORAL ROUTE 2 TIMES PER DAY TABLET ORAL 2
Qty: 0 | Refills: 0 | COMMUNITY
Start: 1900-01-01

## 2023-01-11 RX ORDER — PROMETHAZINE HYDROCHLORIDE 25 MG/1
TABLET ORAL
Qty: 0 | Refills: 0 | COMMUNITY
Start: 1900-01-01

## 2023-01-11 RX ORDER — LACTULOSE 10 G/15ML
45 ML TID AS NEEDED FOR CONSTIPATION SOLUTION ORAL TID
Qty: 4050 ML | Refills: 6 | OUTPATIENT

## 2023-01-11 RX ORDER — FAMOTIDINE 40 MG/1
TAKE 1 TABLET BY ORAL ROUTE AT BEDTIME FOR 90 DAYS TABLET, FILM COATED ORAL ONCE A DAY
Qty: 90 TABLET | Refills: 3

## 2023-01-11 RX ORDER — ROPINIROLE HYDROCHLORIDE 0.25 MG/1
TAKE 1 TABLET (0.25 MG) BY ORAL ROUTE 1-3 HOURS BEFORE BEDTIME TABLET, FILM COATED ORAL
Qty: 1 | Refills: 0 | COMMUNITY
Start: 1900-01-01

## 2023-01-11 NOTE — HPI-TODAY'S VISIT:
1/11/2023 Ms. Correa is here for f/u of Cirrhosis. She had a MRI 9/2022 negative for ascites and HCC. She had an EGD for varices. This showed three large columns. Two bands were placed with incomplete eradication. She also had some benign gastric polyps. She was having some looser stool. Xifaxan has helped and now its back to being loose. She has been on colestipol in the past with good result. She denies any mental change. She is not taking the lactulose. She denies is bleeding. CS

## 2023-01-11 NOTE — HPI-OTHER HISTORIES
History Of Present Illness    4/2019 Ms. 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 She is a previous patient of Dr. Abdalla and she has been seeing a hepatologist at [place] every 6 months. She has been getting MRI's every 6 months. Her last MRI was in January with dysplastic appearing nodules. These have been stable. EGD 2018 with trace varices, retained food in her stomach, and portal HTN. She is taking protonix 40mg BID and zantac. Her reflux well controlled. She denies any nausea or vomiting. She denies any ascites. She denies any free bleeding. She had mental changes with being forgetful and sedated in the morning. She had been taking Ambien. This is resolved. She is not taking lactulose.  She has a history of colon cancer in 2011 with resection and chemo. Last colonoscopy was 2018 and normal except for diverticulosis. She is having diarrhea every meal. Imodium is not helping.  CS  6/17/2019 Ms. Correa is here for f/u of Cirrhosis. She is well compensated. She had an MRI with stable dysplastic appearing nodules. Her reflux is well controleld on Protonix 40mg BID. She on atenolol for trace varices. She denies any increase in bleeding or ascites. She has some mild confusion thought to be med related. She was having diarrhea and given colestipol. This made her sick. She is now having constipation. CS  September 23, 2019 Ms. Dunaway is here for f/u of Cirrhosis. Her MRI ni June was stable and due in December. She denies any reflux since taking protonix 40mg BID. She denies any increase in bleeding. She has a sore on her shin that has been present for several months. She saw her PCP with a bx. She has had some all over swelling. Her PCP started her on a diuretic. She has been taking this for a month. Her potassium was a little low in June and has not been rechecked. She was having minor confusion at her last OV. She denies any further issues. She has constipation to diarrhea. She is not taking anything at this time. She is having some LLQ pain after eating. She was given ibuprofen by her PCP. She was given thyroid medication at her last PCP OV. She is not taking this.  She has had some numbness and tingling in her hands. She was told this was coming from her neck. She is seeing a surgeon next month. CS  February 24, 2020 Ms. Correa is here for f/u of Cirrhosis. She had a MRI in December that was stable. Her reflux is well controlled with PPI daily. She has mild breakthrough if she eats late. She denies any increase in bleeding. She denies any abdominal swelling. Her bowels are now normal. She does not need the colestipol or lactulose. She is having surgery next month on her neck. She overall is feeling well. She has not followed up with her PCP regarding her thyroid. CS   10/7/2020 Ms. Correa is here for f/u of Cirrhosis. She had surgery on her neck since her last OV. She is back on pain medication. She is starting to feel better. She is having some worsening reflux with food coming back up when she lays down. She is having some nausea. She is having some RUQ pain intermittently. She is due for MRI for HCC and surveillance of a dysplastic nodule. She denies any swelling, increase in bleeding, or mental changes. Her bowels are currently normal without lactulose. CS   11/18/2020 Ms. Correa is here for f/u of Cirrhosis. She had surgery on her neck and was started on pain medication. This made her reflux worse. She was started on pepcid. She was unable to take this because she felt it made her swell. She is no longer having this symptoms. She does however had a lot of bloating and gas. She is no longer taking lactulose and having some constipation. She had her MRI 11/12 for HCC and surveillance of a dysplastic nodule this was stable. She denies any swelling, increase in bleeding, or mental changes. She is due for colonoscopy next year. CS   2/25/2021 Ms. Correa is here for f/u of Cirrhosis and colon cancer surgery. She is out of her pain medication today and having a lot of nerve pain down her legs. This makes it hard to walk at times. Her reflux is well controlled. Her bowels are more normal without lactulose or colestipol. if she gets backed up she will take lactulose with relief. She denies any ascites. She denies any bleeding. She denies any mental changes. She had her MRI 11/12 for HCC and surveillance of a dysplastic nodule this was stable. Overall, she is feeling well. CS   5/26/2021 Ms. Correa is here for f/u of Cirrhosis and colonoscopy. She continues to have pain but this is fairly well controlled with pain medication. She tries to limit how much she takes. She does have some minor fatigue from this. She denies any confusion or sedation. Her reflux is well controlled. Her bowels are more normal with lactulose prn. She has bloating at times but not too bothersome.  She denies any ascites. She denies any bleeding. She denies any mental changes. She had her MRI 11/12 for HCC and surveillance of a dysplastic nodule this was stable. She is due for repeat. Her repeat colonoscopy showed a 4mm inflammatory polyp in the rectum. Overall, she is feeling well. CS 0  10/4/2021 Ms. Correa is here for f/u of Cirrhosis. Since her last OV, she fell and fractured her patella and bruised her right eye. She was taking to the ER and had a negative head CT. She is making a little trouble getting around with her knee brace but doing ok. She denies any ascites. She has occassional fluid build up everywhere that her PCP has given  her lasix and potassium. She denies any constipation at this time. She will take lactulose if this happens and it works well. She denies any mental changes. She denies is bleeding. Her MRI this summer was negative for HCC and due in December. CS   3/7/2022 Ms. Correa is here for f/u of Cirrhosis. She had a MRI last month negative for ascites and HCC. She has been having some increase in bloating after eating and increase in gas. She denies any constipation at this time and has been having more looser urgent stools. She is not taking the lactulose. She denies any mental changes. She denies is bleeding. Her  is having some back issues and having testing.  CS  6/8/2022 Ms. Correa is here for f/u of Cirrhosis. She had a MRI last month negative for ascites and HCC. She has been having some increase in bloating after eating and increase in gas. She denies any constipation at this time and has been having more looser urgent stools. She is not taking the lactulose. She denies any mental changes. She denies is bleeding. Her  is having some back issues and having testing.  CS  8/31/2022 Ms. Correa is here for f/u of Cirrhosis. She had a MRI 2/2022 negative for ascites and HCC. She has been having RUQ pain after eating. This has gotten worse since her last OV. She has also had more nerve issues and on gabapentin. She is not taking it as rx.  She denies any constipation at this time. She was having some looser stool. She is now on xifaxan and this is helping. She denies any mental change. She is not taking the lactulose. She denies is bleeding. CS  9/2022 MRI: Cirrhosis, negative for HCC  11/18/2022 EGD: 3 columns of non-bleeding grade III varices, lower third, two bands were placed with incomplete eradication of varices. Recommend repeat in 3 months

## 2023-01-23 ENCOUNTER — TELEPHONE ENCOUNTER (OUTPATIENT)
Dept: URBAN - NONMETROPOLITAN AREA CLINIC 13 | Facility: CLINIC | Age: 63
End: 2023-01-23

## 2023-02-20 ENCOUNTER — TELEPHONE ENCOUNTER (OUTPATIENT)
Dept: URBAN - NONMETROPOLITAN AREA CLINIC 2 | Facility: CLINIC | Age: 63
End: 2023-02-20

## 2023-02-20 RX ORDER — DIAZEPAM 2 MG/1
TAKE 1 TABLET BY ORAL ROUTE 30 MINS PRIOR TO MRI TABLET ORAL 2
Qty: 1 | Refills: 0

## 2023-03-03 ENCOUNTER — OFFICE VISIT (OUTPATIENT)
Dept: URBAN - METROPOLITAN AREA MEDICAL CENTER 1 | Facility: MEDICAL CENTER | Age: 63
End: 2023-03-03
Payer: COMMERCIAL

## 2023-03-03 DIAGNOSIS — K76.6 CLINICALLY SIGNIFICANT PORTAL HYPERTENSION: ICD-10-CM

## 2023-03-03 DIAGNOSIS — I85.10 ESOPH VARICE OTHER DIS: ICD-10-CM

## 2023-03-03 DIAGNOSIS — K31.89 ACQUIRED DEFORMITY OF DUODENUM: ICD-10-CM

## 2023-03-03 PROCEDURE — 43244 EGD VARICES LIGATION: CPT | Performed by: INTERNAL MEDICINE

## 2023-03-11 ENCOUNTER — LAB OUTSIDE AN ENCOUNTER (OUTPATIENT)
Dept: URBAN - NONMETROPOLITAN AREA CLINIC 13 | Facility: CLINIC | Age: 63
End: 2023-03-11

## 2023-03-20 ENCOUNTER — ERX REFILL RESPONSE (OUTPATIENT)
Dept: URBAN - NONMETROPOLITAN AREA CLINIC 2 | Facility: CLINIC | Age: 63
End: 2023-03-20

## 2023-03-20 RX ORDER — RIFAXIMIN 550 MG/1
1 TABLET TABLET ORAL TWICE A DAY
Qty: 180 TABLET | Refills: 3 | OUTPATIENT

## 2023-03-20 RX ORDER — RIFAXIMIN 550 MG/1
TAKE 1 TABLET BY MOUTH TWICE (2) DAILY TABLET ORAL
Qty: 180 TABLET | Refills: 3 | OUTPATIENT

## 2023-05-10 ENCOUNTER — LAB OUTSIDE AN ENCOUNTER (OUTPATIENT)
Dept: URBAN - NONMETROPOLITAN AREA CLINIC 13 | Facility: CLINIC | Age: 63
End: 2023-05-10

## 2023-05-10 ENCOUNTER — LAB OUTSIDE AN ENCOUNTER (OUTPATIENT)
Dept: URBAN - NONMETROPOLITAN AREA CLINIC 2 | Facility: CLINIC | Age: 63
End: 2023-05-10

## 2023-05-10 ENCOUNTER — DASHBOARD ENCOUNTERS (OUTPATIENT)
Age: 63
End: 2023-05-10

## 2023-05-10 ENCOUNTER — OFFICE VISIT (OUTPATIENT)
Dept: URBAN - NONMETROPOLITAN AREA CLINIC 13 | Facility: CLINIC | Age: 63
End: 2023-05-10
Payer: COMMERCIAL

## 2023-05-10 VITALS
HEIGHT: 65 IN | SYSTOLIC BLOOD PRESSURE: 121 MMHG | DIASTOLIC BLOOD PRESSURE: 73 MMHG | BODY MASS INDEX: 33.89 KG/M2 | WEIGHT: 203.4 LBS | HEART RATE: 60 BPM

## 2023-05-10 DIAGNOSIS — I85.00 ESOPHAGEAL VARICES DETERMINED BY ENDOSCOPY: ICD-10-CM

## 2023-05-10 DIAGNOSIS — R19.4 BOWEL HABIT CHANGES: ICD-10-CM

## 2023-05-10 DIAGNOSIS — K76.89: ICD-10-CM

## 2023-05-10 DIAGNOSIS — K74.60 CIRRHOSIS: ICD-10-CM

## 2023-05-10 DIAGNOSIS — Z85.038 PERSONAL HISTORY OF COLON CANCER: ICD-10-CM

## 2023-05-10 DIAGNOSIS — R10.9 ABDOMINAL PAIN: ICD-10-CM

## 2023-05-10 DIAGNOSIS — K21.9 GERD (GASTROESOPHAGEAL REFLUX DISEASE): ICD-10-CM

## 2023-05-10 PROBLEM — 28670008: Status: ACTIVE | Noted: 2023-01-11

## 2023-05-10 LAB
A/G RATIO: 1.1
ALBUMIN: 3.7
ALKALINE PHOSPHATASE: 158
ALT (SGPT): 19
ANION GAP: 14
AST (SGOT): 33
BILIRUBIN DIRECT: 0.16
BILIRUBIN TOTAL: 0.6
BILIRUBIN, INDIRECT: 0.4
BLOOD UREA NITROGEN: 10
BUN / CREAT RATIO: 15
BUN / CREAT RATIO: 15
CALCIUM: 9.1
CALCIUM: 9.1
CHLORIDE: 97
CO2: 31
CREATININE, SERUM: 0.66
EGFR (CKD-EPI): >60
GLUCOSE: 138
HEMATOCRIT: 35.5
HEMOGLOBIN: 11.6
INR: 1.14
MEAN CORPUSCULAR HEMOGLOBIN CONC: 32.6
MEAN CORPUSCULAR HEMOGLOBIN: 25.4
MEAN CORPUSCULAR VOLUME: 78
MEAN PLATELET VOLUME: 8.4
PLATELET COUNT: 104
POTASSIUM: 3.4
PROTEIN TOTAL: 7.1
PROTHROMBIN TIME: 12.7
RED BLOOD CELL COUNT: 4.56
RED CELL DISTRIBUTION WIDTH: 17
SODIUM: 139
WHITE BLOOD CELL COUNT: 4.2

## 2023-05-10 PROCEDURE — 99214 OFFICE O/P EST MOD 30 MIN: CPT | Performed by: NURSE PRACTITIONER

## 2023-05-10 RX ORDER — DULOXETINE 60 MG/1
TAKE 2 CAPSULES (120 MG) BY ORAL ROUTE ONCE DAILY CAPSULE, DELAYED RELEASE PELLETS ORAL 1
Qty: 0 | Refills: 0 | COMMUNITY
Start: 1900-01-01

## 2023-05-10 RX ORDER — TIZANIDINE HYDROCHLORIDE 4 MG/1
CAPSULE ORAL
Qty: 0 | Refills: 0 | COMMUNITY
Start: 1900-01-01

## 2023-05-10 RX ORDER — DIAZEPAM 2 MG/1
TAKE 1 TABLET BY ORAL ROUTE 30 MINS PRIOR TO MRI TABLET ORAL 2
Qty: 1 | Refills: 0

## 2023-05-10 RX ORDER — PROMETHAZINE HYDROCHLORIDE 25 MG/1
TABLET ORAL
Qty: 0 | Refills: 0 | COMMUNITY
Start: 1900-01-01

## 2023-05-10 RX ORDER — ATENOLOL 50 MG/1
TAKE 1 TABLET (50 MG) BY ORAL ROUTE 2 TIMES PER DAY TABLET ORAL 2
Qty: 0 | Refills: 0 | COMMUNITY
Start: 1900-01-01

## 2023-05-10 RX ORDER — ROPINIROLE HYDROCHLORIDE 0.25 MG/1
TAKE 1 TABLET (0.25 MG) BY ORAL ROUTE 1-3 HOURS BEFORE BEDTIME TABLET, FILM COATED ORAL
Qty: 1 | Refills: 0 | COMMUNITY
Start: 1900-01-01

## 2023-05-10 RX ORDER — DIAZEPAM 2 MG/1
TAKE 1 TABLET BY ORAL ROUTE 30 MINS PRIOR TO MRI TABLET ORAL 2
Qty: 1 | Refills: 0 | Status: ACTIVE | COMMUNITY

## 2023-05-10 RX ORDER — OXYCODONE HYDROCHLORIDE 5 MG/1
CAPSULE ORAL
Qty: 0 | Refills: 0 | COMMUNITY
Start: 1900-01-01

## 2023-05-10 RX ORDER — FAMOTIDINE 40 MG/1
TAKE 1 TABLET BY ORAL ROUTE AT BEDTIME FOR 90 DAYS TABLET, FILM COATED ORAL ONCE A DAY
Qty: 90 TABLET | Refills: 3 | Status: ACTIVE | COMMUNITY

## 2023-05-10 RX ORDER — COLESTIPOL HYDROCHLORIDE 1 G/1
1 TABLET 30MINS PRIOR TO A MEAL TABLET, FILM COATED ORAL ONCE A DAY
Qty: 30 TABLET | Refills: 6 | OUTPATIENT

## 2023-05-10 RX ORDER — FAMOTIDINE 40 MG/1
TAKE 1 TABLET BY ORAL ROUTE AT BEDTIME FOR 90 DAYS TABLET, FILM COATED ORAL ONCE A DAY
Qty: 90 TABLET | Refills: 3

## 2023-05-10 RX ORDER — LACTULOSE 10 G/15ML
45 ML TID AS NEEDED FOR CONSTIPATION SOLUTION ORAL TID
Qty: 4050 ML | Refills: 6 | Status: ACTIVE | COMMUNITY

## 2023-05-10 RX ORDER — ZOLPIDEM TARTRATE 10 MG/1
TAKE 1 TABLET (10 MG) BY ORAL ROUTE ONCE DAILY AT BEDTIME TABLET, FILM COATED ORAL 1
Qty: 0 | Refills: 0 | COMMUNITY
Start: 1900-01-01

## 2023-05-10 RX ORDER — LACTULOSE 10 G/15ML
45 ML SOLUTION ORAL THREE TIMES A DAY
Qty: 4050 ML | Refills: 11 | OUTPATIENT

## 2023-05-10 RX ORDER — RIFAXIMIN 550 MG/1
1 TABLET TABLET ORAL TWICE A DAY
Qty: 180 TABLET | Refills: 3 | OUTPATIENT

## 2023-05-10 RX ORDER — COLESTIPOL HYDROCHLORIDE 1 G/1
1 TABLET 30MINS PRIOR TO A MEAL TABLET, FILM COATED ORAL ONCE A DAY
Qty: 30 TABLET | Refills: 6 | Status: ACTIVE | COMMUNITY
Start: 2023-01-11

## 2023-05-10 RX ORDER — RIFAXIMIN 550 MG/1
TAKE 1 TABLET BY MOUTH TWICE (2) DAILY TABLET ORAL
Qty: 180 TABLET | Refills: 3 | Status: ACTIVE | COMMUNITY

## 2023-05-10 RX ORDER — METFORMIN HYDROCHLORIDE 500 MG/1
TAKE 1 TABLET (500 MG) BY ORAL ROUTE 2 TIMES PER DAY WITH MORNING AND EVENING MEALS TABLET, COATED ORAL 2
Qty: 0 | Refills: 0 | COMMUNITY
Start: 1900-01-01

## 2023-05-10 NOTE — HPI-TODAY'S VISIT:
5/10/2023 Ms. Correa is here for f/u of Cirrhosis. She had a MRI 1/2023 negative for ascites and HCC. She had an EGD for varices. This showed three large columns. Two bands were placed with incomplete eradication. She had a repeat in March that again was incomplete and recommended repeat. She is on atenolol. Her reflux is well controlled. She has abdominal swelling on and off. This improves after a BM. She tries to avoid salt. She denies any bleeding. She gets confused at times. She is on xifaxan BID. She is not taking lactulose. Her bowels are eradic at times. CS

## 2023-05-10 NOTE — HPI-OTHER HISTORIES
History Of Present Illness    4/2019 Ms. 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 She is a previous patient of Dr. Abdalla and she has been seeing a hepatologist at [facility] every 6 months. She has been getting MRI's every 6 months. Her last MRI was in January with dysplastic appearing nodules. These have been stable. EGD 2018 with trace varices, retained food in her stomach, and portal HTN. She is taking protonix 40mg BID and zantac. Her reflux well controlled. She denies any nausea or vomiting. She denies any ascites. She denies any free bleeding. She had mental changes with being forgetful and sedated in the morning. She had been taking Ambien. This is resolved. She is not taking lactulose.  She has a history of colon cancer in 2011 with resection and chemo. Last colonoscopy was 2018 and normal except for diverticulosis. She is having diarrhea every meal. Imodium is not helping.  CS  6/17/2019 Ms. Correa is here for f/u of Cirrhosis. She is well compensated. She had an MRI with stable dysplastic appearing nodules. Her reflux is well controleld on Protonix 40mg BID. She on atenolol for trace varices. She denies any increase in bleeding or ascites. She has some mild confusion thought to be med related. She was having diarrhea and given colestipol. This made her sick. She is now having constipation. CS  September 23, 2019 Ms. Dunaway is here for f/u of Cirrhosis. Her MRI ni June was stable and due in December. She denies any reflux since taking protonix 40mg BID. She denies any increase in bleeding. She has a sore on her shin that has been present for several months. She saw her PCP with a bx. She has had some all over swelling. Her PCP started her on a diuretic. She has been taking this for a month. Her potassium was a little low in June and has not been rechecked. She was having minor confusion at her last OV. She denies any further issues. She has constipation to diarrhea. She is not taking anything at this time. She is having some LLQ pain after eating. She was given ibuprofen by her PCP. She was given thyroid medication at her last PCP OV. She is not taking this.  She has had some numbness and tingling in her hands. She was told this was coming from her neck. She is seeing a surgeon next month. CS  February 24, 2020 Ms. Correa is here for f/u of Cirrhosis. She had a MRI in December that was stable. Her reflux is well controlled with PPI daily. She has mild breakthrough if she eats late. She denies any increase in bleeding. She denies any abdominal swelling. Her bowels are now normal. She does not need the colestipol or lactulose. She is having surgery next month on her neck. She overall is feeling well. She has not followed up with her PCP regarding her thyroid. CS   10/7/2020 Ms. Correa is here for f/u of Cirrhosis. She had surgery on her neck since her last OV. She is back on pain medication. She is starting to feel better. She is having some worsening reflux with food coming back up when she lays down. She is having some nausea. She is having some RUQ pain intermittently. She is due for MRI for HCC and surveillance of a dysplastic nodule. She denies any swelling, increase in bleeding, or mental changes. Her bowels are currently normal without lactulose. CS   11/18/2020 Ms. Correa is here for f/u of Cirrhosis. She had surgery on her neck and was started on pain medication. This made her reflux worse. She was started on pepcid. She was unable to take this because she felt it made her swell. She is no longer having this symptoms. She does however had a lot of bloating and gas. She is no longer taking lactulose and having some constipation. She had her MRI 11/12 for HCC and surveillance of a dysplastic nodule this was stable. She denies any swelling, increase in bleeding, or mental changes. She is due for colonoscopy next year. CS   2/25/2021 Ms. Correa is here for f/u of Cirrhosis and colon cancer surgery. She is out of her pain medication today and having a lot of nerve pain down her legs. This makes it hard to walk at times. Her reflux is well controlled. Her bowels are more normal without lactulose or colestipol. if she gets backed up she will take lactulose with relief. She denies any ascites. She denies any bleeding. She denies any mental changes. She had her MRI 11/12 for HCC and surveillance of a dysplastic nodule this was stable. Overall, she is feeling well. CS   5/26/2021 Ms. Correa is here for f/u of Cirrhosis and colonoscopy. She continues to have pain but this is fairly well controlled with pain medication. She tries to limit how much she takes. She does have some minor fatigue from this. She denies any confusion or sedation. Her reflux is well controlled. Her bowels are more normal with lactulose prn. She has bloating at times but not too bothersome.  She denies any ascites. She denies any bleeding. She denies any mental changes. She had her MRI 11/12 for HCC and surveillance of a dysplastic nodule this was stable. She is due for repeat. Her repeat colonoscopy showed a 4mm inflammatory polyp in the rectum. Overall, she is feeling well. CS 0  10/4/2021 Ms. Correa is here for f/u of Cirrhosis. Since her last OV, she fell and fractured her patella and bruised her right eye. She was taking to the ER and had a negative head CT. She is making a little trouble getting around with her knee brace but doing ok. She denies any ascites. She has occassional fluid build up everywhere that her PCP has given  her lasix and potassium. She denies any constipation at this time. She will take lactulose if this happens and it works well. She denies any mental changes. She denies is bleeding. Her MRI this summer was negative for HCC and due in December. CS   3/7/2022 Ms. Correa is here for f/u of Cirrhosis. She had a MRI last month negative for ascites and HCC. She has been having some increase in bloating after eating and increase in gas. She denies any constipation at this time and has been having more looser urgent stools. She is not taking the lactulose. She denies any mental changes. She denies is bleeding. Her  is having some back issues and having testing.  CS  6/8/2022 Ms. Correa is here for f/u of Cirrhosis. She had a MRI last month negative for ascites and HCC. She has been having some increase in bloating after eating and increase in gas. She denies any constipation at this time and has been having more looser urgent stools. She is not taking the lactulose. She denies any mental changes. She denies is bleeding. Her  is having some back issues and having testing.  CS  8/31/2022 Ms. Correa is here for f/u of Cirrhosis. She had a MRI 2/2022 negative for ascites and HCC. She has been having RUQ pain after eating. This has gotten worse since her last OV. She has also had more nerve issues and on gabapentin. She is not taking it as rx.  She denies any constipation at this time. She was having some looser stool. She is now on xifaxan and this is helping. She denies any mental change. She is not taking the lactulose. She denies is bleeding. CS  9/2022 MRI: Cirrhosis, negative for HCC  11/18/2022 EGD: 3 columns of non-bleeding grade III varices, lower third, two bands were placed with incomplete eradication of varices. Recommend repeat in 3 months  1/11/2023 Ms. Correa is here for f/u of Cirrhosis. She had a MRI 9/2022 negative for ascites and HCC. She had an EGD for varices. This showed three large columns. Two bands were placed with incomplete eradication. She also had some benign gastric polyps. She was having some looser stool. Xifaxan has helped and now its back to being loose. She has been on colestipol in the past with good result. She denies any mental change. She is not taking the lactulose. She denies is bleeding. CS

## 2023-05-19 LAB — AFP-TUMOR MARKER: 7.8

## 2023-07-07 ENCOUNTER — OFFICE VISIT (OUTPATIENT)
Dept: URBAN - METROPOLITAN AREA MEDICAL CENTER 1 | Facility: MEDICAL CENTER | Age: 63
End: 2023-07-07
Payer: COMMERCIAL

## 2023-07-07 DIAGNOSIS — I85.10 ESOPH VARICE OTHER DIS: ICD-10-CM

## 2023-07-07 DIAGNOSIS — K74.69 CIRRHOSIS, CRYPTOGENIC: ICD-10-CM

## 2023-07-07 DIAGNOSIS — K31.89 OTHER DISEASES OF STOMACH AND DUODENUM: ICD-10-CM

## 2023-07-07 PROCEDURE — 43244 EGD VARICES LIGATION: CPT | Performed by: INTERNAL MEDICINE

## 2023-07-14 ENCOUNTER — TELEPHONE ENCOUNTER (OUTPATIENT)
Dept: URBAN - NONMETROPOLITAN AREA CLINIC 13 | Facility: CLINIC | Age: 63
End: 2023-07-14

## 2023-07-21 ENCOUNTER — OFFICE VISIT (OUTPATIENT)
Dept: URBAN - METROPOLITAN AREA TELEHEALTH 2 | Facility: TELEHEALTH | Age: 63
End: 2023-07-21

## 2023-07-21 RX ORDER — ZOLPIDEM TARTRATE 10 MG/1
TAKE 1 TABLET (10 MG) BY ORAL ROUTE ONCE DAILY AT BEDTIME TABLET, FILM COATED ORAL 1
Qty: 0 | Refills: 0 | COMMUNITY
Start: 1900-01-01

## 2023-07-21 RX ORDER — ATENOLOL 50 MG/1
TAKE 1 TABLET (50 MG) BY ORAL ROUTE 2 TIMES PER DAY TABLET ORAL 2
Qty: 0 | Refills: 0 | COMMUNITY
Start: 1900-01-01

## 2023-07-21 RX ORDER — RIFAXIMIN 550 MG/1
1 TABLET TABLET ORAL TWICE A DAY
Qty: 180 TABLET | Refills: 3 | Status: ACTIVE | COMMUNITY

## 2023-07-21 RX ORDER — RIFAXIMIN 550 MG/1
1 TABLET TABLET ORAL TWICE A DAY
Qty: 180 TABLET | Refills: 3 | OUTPATIENT

## 2023-07-21 RX ORDER — DIAZEPAM 2 MG/1
TAKE 1 TABLET BY ORAL ROUTE 30 MINS PRIOR TO MRI TABLET ORAL 2
Qty: 1 | Refills: 0

## 2023-07-21 RX ORDER — OXYCODONE HYDROCHLORIDE 5 MG/1
CAPSULE ORAL
Qty: 0 | Refills: 0 | COMMUNITY
Start: 1900-01-01

## 2023-07-21 RX ORDER — COLESTIPOL HYDROCHLORIDE 1 G/1
1 TABLET 30MINS PRIOR TO A MEAL TABLET, FILM COATED ORAL ONCE A DAY
Qty: 30 TABLET | Refills: 6 | OUTPATIENT

## 2023-07-21 RX ORDER — TIZANIDINE HYDROCHLORIDE 4 MG/1
CAPSULE ORAL
Qty: 0 | Refills: 0 | COMMUNITY
Start: 1900-01-01

## 2023-07-21 RX ORDER — PROMETHAZINE HYDROCHLORIDE 25 MG/1
TABLET ORAL
Qty: 0 | Refills: 0 | COMMUNITY
Start: 1900-01-01

## 2023-07-21 RX ORDER — FAMOTIDINE 40 MG/1
TAKE 1 TABLET BY ORAL ROUTE AT BEDTIME FOR 90 DAYS TABLET, FILM COATED ORAL ONCE A DAY
Qty: 90 TABLET | Refills: 3

## 2023-07-21 RX ORDER — ROPINIROLE HYDROCHLORIDE 0.25 MG/1
TAKE 1 TABLET (0.25 MG) BY ORAL ROUTE 1-3 HOURS BEFORE BEDTIME TABLET, FILM COATED ORAL
Qty: 1 | Refills: 0 | COMMUNITY
Start: 1900-01-01

## 2023-07-21 RX ORDER — LACTULOSE 10 G/15ML
45 ML SOLUTION ORAL THREE TIMES A DAY
Qty: 4050 ML | Refills: 11 | Status: ACTIVE | COMMUNITY

## 2023-07-21 RX ORDER — COLESTIPOL HYDROCHLORIDE 1 G/1
1 TABLET 30MINS PRIOR TO A MEAL TABLET, FILM COATED ORAL ONCE A DAY
Qty: 30 TABLET | Refills: 6 | Status: ACTIVE | COMMUNITY

## 2023-07-21 RX ORDER — METFORMIN HYDROCHLORIDE 500 MG/1
TAKE 1 TABLET (500 MG) BY ORAL ROUTE 2 TIMES PER DAY WITH MORNING AND EVENING MEALS TABLET, COATED ORAL 2
Qty: 0 | Refills: 0 | COMMUNITY
Start: 1900-01-01

## 2023-07-21 RX ORDER — LACTULOSE 10 G/15ML
45 ML SOLUTION ORAL THREE TIMES A DAY
Qty: 4050 ML | Refills: 11 | OUTPATIENT

## 2023-07-21 RX ORDER — FAMOTIDINE 40 MG/1
TAKE 1 TABLET BY ORAL ROUTE AT BEDTIME FOR 90 DAYS TABLET, FILM COATED ORAL ONCE A DAY
Qty: 90 TABLET | Refills: 3 | Status: ACTIVE | COMMUNITY

## 2023-07-21 RX ORDER — DIAZEPAM 2 MG/1
TAKE 1 TABLET BY ORAL ROUTE 30 MINS PRIOR TO MRI TABLET ORAL 2
Qty: 1 | Refills: 0 | Status: ACTIVE | COMMUNITY

## 2023-07-21 RX ORDER — RIFAXIMIN 550 MG/1
TAKE 1 TABLET BY MOUTH TWICE (2) DAILY TABLET ORAL
Qty: 180 TABLET | Refills: 3 | Status: ACTIVE | COMMUNITY

## 2023-07-21 RX ORDER — DULOXETINE 60 MG/1
TAKE 2 CAPSULES (120 MG) BY ORAL ROUTE ONCE DAILY CAPSULE, DELAYED RELEASE PELLETS ORAL 1
Qty: 0 | Refills: 0 | COMMUNITY
Start: 1900-01-01

## 2023-07-21 NOTE — HPI-TODAY'S VISIT:
7/21/2023 Ms. Correa is evaluated via [facility] for abdominal pain and diarrhea. She was last seen in May for f/u of Cirrhosis. She had a MRI 1/2023 negative for ascites and HCC. She had an EGD for varices. This showed three large columns. Two bands were placed with incomplete eradication. She had a repeat in March that again was incomplete and recommended repeat. She is on atenolol. Her reflux is well controlled. She has abdominal swelling on and off. This improves after a BM. She tries to avoid salt. She denies any bleeding. She gets confused at times. She is on xifaxan BID. She is not taking lactulose. Her bowels are eradic at times. CS

## 2023-07-21 NOTE — HPI-OTHER HISTORIES
History Of Present Illness    4/2019 Ms. 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 She is a previous patient of Dr. Abdalla and she has been seeing a hepatologist at [facility] every 6 months. She has been getting MRI's every 6 months. Her last MRI was in January with dysplastic appearing nodules. These have been stable. EGD 2018 with trace varices, retained food in her stomach, and portal HTN. She is taking protonix 40mg BID and zantac. Her reflux well controlled. She denies any nausea or vomiting. She denies any ascites. She denies any free bleeding. She had mental changes with being forgetful and sedated in the morning. She had been taking Ambien. This is resolved. She is not taking lactulose.  She has a history of colon cancer in 2011 with resection and chemo. Last colonoscopy was 2018 and normal except for diverticulosis. She is having diarrhea every meal. Imodium is not helping.  CS  6/17/2019 Ms. Correa is here for f/u of Cirrhosis. She is well compensated. She had an MRI with stable dysplastic appearing nodules. Her reflux is well controleld on Protonix 40mg BID. She on atenolol for trace varices. She denies any increase in bleeding or ascites. She has some mild confusion thought to be med related. She was having diarrhea and given colestipol. This made her sick. She is now having constipation. CS  September 23, 2019 Ms. Dunaway is here for f/u of Cirrhosis. Her MRI ni June was stable and due in December. She denies any reflux since taking protonix 40mg BID. She denies any increase in bleeding. She has a sore on her shin that has been present for several months. She saw her PCP with a bx. She has had some all over swelling. Her PCP started her on a diuretic. She has been taking this for a month. Her potassium was a little low in June and has not been rechecked. She was having minor confusion at her last OV. She denies any further issues. She has constipation to diarrhea. She is not taking anything at this time. She is having some LLQ pain after eating. She was given ibuprofen by her PCP. She was given thyroid medication at her last PCP OV. She is not taking this.  She has had some numbness and tingling in her hands. She was told this was coming from her neck. She is seeing a surgeon next month. CS  February 24, 2020 Ms. Correa is here for f/u of Cirrhosis. She had a MRI in December that was stable. Her reflux is well controlled with PPI daily. She has mild breakthrough if she eats late. She denies any increase in bleeding. She denies any abdominal swelling. Her bowels are now normal. She does not need the colestipol or lactulose. She is having surgery next month on her neck. She overall is feeling well. She has not followed up with her PCP regarding her thyroid. CS   10/7/2020 Ms. Correa is here for f/u of Cirrhosis. She had surgery on her neck since her last OV. She is back on pain medication. She is starting to feel better. She is having some worsening reflux with food coming back up when she lays down. She is having some nausea. She is having some RUQ pain intermittently. She is due for MRI for HCC and surveillance of a dysplastic nodule. She denies any swelling, increase in bleeding, or mental changes. Her bowels are currently normal without lactulose. CS   11/18/2020 Ms. Correa is here for f/u of Cirrhosis. She had surgery on her neck and was started on pain medication. This made her reflux worse. She was started on pepcid. She was unable to take this because she felt it made her swell. She is no longer having this symptoms. She does however had a lot of bloating and gas. She is no longer taking lactulose and having some constipation. She had her MRI 11/12 for HCC and surveillance of a dysplastic nodule this was stable. She denies any swelling, increase in bleeding, or mental changes. She is due for colonoscopy next year. CS   2/25/2021 Ms. Correa is here for f/u of Cirrhosis and colon cancer surgery. She is out of her pain medication today and having a lot of nerve pain down her legs. This makes it hard to walk at times. Her reflux is well controlled. Her bowels are more normal without lactulose or colestipol. if she gets backed up she will take lactulose with relief. She denies any ascites. She denies any bleeding. She denies any mental changes. She had her MRI 11/12 for HCC and surveillance of a dysplastic nodule this was stable. Overall, she is feeling well. CS   5/26/2021 Ms. Correa is here for f/u of Cirrhosis and colonoscopy. She continues to have pain but this is fairly well controlled with pain medication. She tries to limit how much she takes. She does have some minor fatigue from this. She denies any confusion or sedation. Her reflux is well controlled. Her bowels are more normal with lactulose prn. She has bloating at times but not too bothersome.  She denies any ascites. She denies any bleeding. She denies any mental changes. She had her MRI 11/12 for HCC and surveillance of a dysplastic nodule this was stable. She is due for repeat. Her repeat colonoscopy showed a 4mm inflammatory polyp in the rectum. Overall, she is feeling well. CS 0  10/4/2021 Ms. Correa is here for f/u of Cirrhosis. Since her last OV, she fell and fractured her patella and bruised her right eye. She was taking to the ER and had a negative head CT. She is making a little trouble getting around with her knee brace but doing ok. She denies any ascites. She has occassional fluid build up everywhere that her PCP has given  her lasix and potassium. She denies any constipation at this time. She will take lactulose if this happens and it works well. She denies any mental changes. She denies is bleeding. Her MRI this summer was negative for HCC and due in December. CS   3/7/2022 Ms. Correa is here for f/u of Cirrhosis. She had a MRI last month negative for ascites and HCC. She has been having some increase in bloating after eating and increase in gas. She denies any constipation at this time and has been having more looser urgent stools. She is not taking the lactulose. She denies any mental changes. She denies is bleeding. Her  is having some back issues and having testing.  CS  6/8/2022 Ms. Correa is here for f/u of Cirrhosis. She had a MRI last month negative for ascites and HCC. She has been having some increase in bloating after eating and increase in gas. She denies any constipation at this time and has been having more looser urgent stools. She is not taking the lactulose. She denies any mental changes. She denies is bleeding. Her  is having some back issues and having testing.  CS  8/31/2022 Ms. Correa is here for f/u of Cirrhosis. She had a MRI 2/2022 negative for ascites and HCC. She has been having RUQ pain after eating. This has gotten worse since her last OV. She has also had more nerve issues and on gabapentin. She is not taking it as rx.  She denies any constipation at this time. She was having some looser stool. She is now on xifaxan and this is helping. She denies any mental change. She is not taking the lactulose. She denies is bleeding. CS  9/2022 MRI: Cirrhosis, negative for HCC  11/18/2022 EGD: 3 columns of non-bleeding grade III varices, lower third, two bands were placed with incomplete eradication of varices. Recommend repeat in 3 months  1/11/2023 Ms. Correa is here for f/u of Cirrhosis. She had a MRI 9/2022 negative for ascites and HCC. She had an EGD for varices. This showed three large columns. Two bands were placed with incomplete eradication. She also had some benign gastric polyps. She was having some looser stool. Xifaxan has helped and now its back to being loose. She has been on colestipol in the past with good result. She denies any mental change. She is not taking the lactulose. She denies is bleeding. CS  5/10/2023 Ms. Correa is here for f/u of Cirrhosis. She had a MRI 1/2023 negative for ascites and HCC. She had an EGD for varices. This showed three large columns. Two bands were placed with incomplete eradication. She had a repeat in March that again was incomplete and recommended repeat. She is on atenolol. Her reflux is well controlled. She has abdominal swelling on and off. This improves after a BM. She tries to avoid salt. She denies any bleeding. She gets confused at times. She is on xifaxan BID. She is not taking lactulose. Her bowels are eradic at times. CS

## 2023-11-13 ENCOUNTER — OFFICE VISIT (OUTPATIENT)
Dept: URBAN - NONMETROPOLITAN AREA CLINIC 13 | Facility: CLINIC | Age: 63
End: 2023-11-13

## 2023-11-13 RX ORDER — COLESTIPOL HYDROCHLORIDE 1 G/1
1 TABLET 30MINS PRIOR TO A MEAL TABLET, FILM COATED ORAL ONCE A DAY
Qty: 30 TABLET | Refills: 6 | Status: ACTIVE | COMMUNITY

## 2023-11-13 RX ORDER — LACTULOSE 10 G/15ML
45 ML SOLUTION ORAL THREE TIMES A DAY
Qty: 4050 ML | Refills: 11 | Status: ACTIVE | COMMUNITY

## 2023-11-13 RX ORDER — METFORMIN HYDROCHLORIDE 500 MG/1
TAKE 1 TABLET (500 MG) BY ORAL ROUTE 2 TIMES PER DAY WITH MORNING AND EVENING MEALS TABLET, COATED ORAL 2
Qty: 0 | Refills: 0 | COMMUNITY
Start: 1900-01-01

## 2023-11-13 RX ORDER — ROPINIROLE HYDROCHLORIDE 0.25 MG/1
TAKE 1 TABLET (0.25 MG) BY ORAL ROUTE 1-3 HOURS BEFORE BEDTIME TABLET, FILM COATED ORAL
Qty: 1 | Refills: 0 | COMMUNITY
Start: 1900-01-01

## 2023-11-13 RX ORDER — FAMOTIDINE 40 MG/1
TAKE 1 TABLET BY ORAL ROUTE AT BEDTIME FOR 90 DAYS TABLET, FILM COATED ORAL ONCE A DAY
Qty: 90 TABLET | Refills: 3 | Status: ACTIVE | COMMUNITY

## 2023-11-13 RX ORDER — PROMETHAZINE HYDROCHLORIDE 25 MG/1
TABLET ORAL
Qty: 0 | Refills: 0 | COMMUNITY
Start: 1900-01-01

## 2023-11-13 RX ORDER — RIFAXIMIN 550 MG/1
1 TABLET TABLET ORAL TWICE A DAY
Qty: 180 TABLET | Refills: 3 | Status: ACTIVE | COMMUNITY

## 2023-11-13 RX ORDER — TIZANIDINE HYDROCHLORIDE 4 MG/1
CAPSULE ORAL
Qty: 0 | Refills: 0 | COMMUNITY
Start: 1900-01-01

## 2023-11-13 RX ORDER — RIFAXIMIN 550 MG/1
TAKE 1 TABLET BY MOUTH TWICE (2) DAILY TABLET ORAL
Qty: 180 TABLET | Refills: 3 | Status: ACTIVE | COMMUNITY

## 2023-11-13 RX ORDER — DULOXETINE 60 MG/1
TAKE 2 CAPSULES (120 MG) BY ORAL ROUTE ONCE DAILY CAPSULE, DELAYED RELEASE PELLETS ORAL 1
Qty: 0 | Refills: 0 | COMMUNITY
Start: 1900-01-01

## 2023-11-13 RX ORDER — ATENOLOL 50 MG/1
TAKE 1 TABLET (50 MG) BY ORAL ROUTE 2 TIMES PER DAY TABLET ORAL 2
Qty: 0 | Refills: 0 | COMMUNITY
Start: 1900-01-01

## 2023-11-13 RX ORDER — OXYCODONE HYDROCHLORIDE 5 MG/1
CAPSULE ORAL
Qty: 0 | Refills: 0 | COMMUNITY
Start: 1900-01-01

## 2023-11-13 RX ORDER — ZOLPIDEM TARTRATE 10 MG/1
TAKE 1 TABLET (10 MG) BY ORAL ROUTE ONCE DAILY AT BEDTIME TABLET, FILM COATED ORAL 1
Qty: 0 | Refills: 0 | COMMUNITY
Start: 1900-01-01

## 2023-11-13 RX ORDER — DIAZEPAM 2 MG/1
TAKE 1 TABLET BY ORAL ROUTE 30 MINS PRIOR TO MRI TABLET ORAL 2
Qty: 1 | Refills: 0 | Status: ACTIVE | COMMUNITY

## 2023-11-13 NOTE — HPI-OTHER HISTORIES
History Of Present Illness    4/2019 Ms. 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 She is a previous patient of Dr. Abdalla and she has been seeing a hepatologist at [facility] every 6 months. She has been getting MRI's every 6 months. Her last MRI was in January with dysplastic appearing nodules. These have been stable. EGD 2018 with trace varices, retained food in her stomach, and portal HTN. She is taking protonix 40mg BID and zantac. Her reflux well controlled. She denies any nausea or vomiting. She denies any ascites. She denies any free bleeding. She had mental changes with being forgetful and sedated in the morning. She had been taking Ambien. This is resolved. She is not taking lactulose.  She has a history of colon cancer in 2011 with resection and chemo. Last colonoscopy was 2018 and normal except for diverticulosis. She is having diarrhea every meal. Imodium is not helping.  CS  6/17/2019 Ms. Correa is here for f/u of Cirrhosis. She is well compensated. She had an MRI with stable dysplastic appearing nodules. Her reflux is well controleld on Protonix 40mg BID. She on atenolol for trace varices. She denies any increase in bleeding or ascites. She has some mild confusion thought to be med related. She was having diarrhea and given colestipol. This made her sick. She is now having constipation. CS  September 23, 2019 Ms. Dunaway is here for f/u of Cirrhosis. Her MRI ni June was stable and due in December. She denies any reflux since taking protonix 40mg BID. She denies any increase in bleeding. She has a sore on her shin that has been present for several months. She saw her PCP with a bx. She has had some all over swelling. Her PCP started her on a diuretic. She has been taking this for a month. Her potassium was a little low in June and has not been rechecked. She was having minor confusion at her last OV. She denies any further issues. She has constipation to diarrhea. She is not taking anything at this time. She is having some LLQ pain after eating. She was given ibuprofen by her PCP. She was given thyroid medication at her last PCP OV. She is not taking this.  She has had some numbness and tingling in her hands. She was told this was coming from her neck. She is seeing a surgeon next month. CS  February 24, 2020 Ms. Correa is here for f/u of Cirrhosis. She had a MRI in December that was stable. Her reflux is well controlled with PPI daily. She has mild breakthrough if she eats late. She denies any increase in bleeding. She denies any abdominal swelling. Her bowels are now normal. She does not need the colestipol or lactulose. She is having surgery next month on her neck. She overall is feeling well. She has not followed up with her PCP regarding her thyroid. CS   10/7/2020 Ms. Correa is here for f/u of Cirrhosis. She had surgery on her neck since her last OV. She is back on pain medication. She is starting to feel better. She is having some worsening reflux with food coming back up when she lays down. She is having some nausea. She is having some RUQ pain intermittently. She is due for MRI for HCC and surveillance of a dysplastic nodule. She denies any swelling, increase in bleeding, or mental changes. Her bowels are currently normal without lactulose. CS   11/18/2020 Ms. Correa is here for f/u of Cirrhosis. She had surgery on her neck and was started on pain medication. This made her reflux worse. She was started on pepcid. She was unable to take this because she felt it made her swell. She is no longer having this symptoms. She does however had a lot of bloating and gas. She is no longer taking lactulose and having some constipation. She had her MRI 11/12 for HCC and surveillance of a dysplastic nodule this was stable. She denies any swelling, increase in bleeding, or mental changes. She is due for colonoscopy next year. CS   2/25/2021 Ms. Correa is here for f/u of Cirrhosis and colon cancer surgery. She is out of her pain medication today and having a lot of nerve pain down her legs. This makes it hard to walk at times. Her reflux is well controlled. Her bowels are more normal without lactulose or colestipol. if she gets backed up she will take lactulose with relief. She denies any ascites. She denies any bleeding. She denies any mental changes. She had her MRI 11/12 for HCC and surveillance of a dysplastic nodule this was stable. Overall, she is feeling well. CS   5/26/2021 Ms. Correa is here for f/u of Cirrhosis and colonoscopy. She continues to have pain but this is fairly well controlled with pain medication. She tries to limit how much she takes. She does have some minor fatigue from this. She denies any confusion or sedation. Her reflux is well controlled. Her bowels are more normal with lactulose prn. She has bloating at times but not too bothersome.  She denies any ascites. She denies any bleeding. She denies any mental changes. She had her MRI 11/12 for HCC and surveillance of a dysplastic nodule this was stable. She is due for repeat. Her repeat colonoscopy showed a 4mm inflammatory polyp in the rectum. Overall, she is feeling well. CS 0  10/4/2021 Ms. Correa is here for f/u of Cirrhosis. Since her last OV, she fell and fractured her patella and bruised her right eye. She was taking to the ER and had a negative head CT. She is making a little trouble getting around with her knee brace but doing ok. She denies any ascites. She has occassional fluid build up everywhere that her PCP has given  her lasix and potassium. She denies any constipation at this time. She will take lactulose if this happens and it works well. She denies any mental changes. She denies is bleeding. Her MRI this summer was negative for HCC and due in December. CS   3/7/2022 Ms. Correa is here for f/u of Cirrhosis. She had a MRI last month negative for ascites and HCC. She has been having some increase in bloating after eating and increase in gas. She denies any constipation at this time and has been having more looser urgent stools. She is not taking the lactulose. She denies any mental changes. She denies is bleeding. Her  is having some back issues and having testing.  CS  6/8/2022 Ms. Correa is here for f/u of Cirrhosis. She had a MRI last month negative for ascites and HCC. She has been having some increase in bloating after eating and increase in gas. She denies any constipation at this time and has been having more looser urgent stools. She is not taking the lactulose. She denies any mental changes. She denies is bleeding. Her  is having some back issues and having testing.  CS  8/31/2022 Ms. Correa is here for f/u of Cirrhosis. She had a MRI 2/2022 negative for ascites and HCC. She has been having RUQ pain after eating. This has gotten worse since her last OV. She has also had more nerve issues and on gabapentin. She is not taking it as rx.  She denies any constipation at this time. She was having some looser stool. She is now on xifaxan and this is helping. She denies any mental change. She is not taking the lactulose. She denies is bleeding. CS  9/2022 MRI: Cirrhosis, negative for HCC  11/18/2022 EGD: 3 columns of non-bleeding grade III varices, lower third, two bands were placed with incomplete eradication of varices. Recommend repeat in 3 months  1/11/2023 Ms. Correa is here for f/u of Cirrhosis. She had a MRI 9/2022 negative for ascites and HCC. She had an EGD for varices. This showed three large columns. Two bands were placed with incomplete eradication. She also had some benign gastric polyps. She was having some looser stool. Xifaxan has helped and now its back to being loose. She has been on colestipol in the past with good result. She denies any mental change. She is not taking the lactulose. She denies is bleeding. CS  5/10/2023 Ms. Correa is here for f/u of Cirrhosis. She had a MRI 1/2023 negative for ascites and HCC. She had an EGD for varices. This showed three large columns. Two bands were placed with incomplete eradication. She had a repeat in March that again was incomplete and recommended repeat. She is on atenolol. Her reflux is well controlled. She has abdominal swelling on and off. This improves after a BM. She tries to avoid salt. She denies any bleeding. She gets confused at times. She is on xifaxan BID. She is not taking lactulose. Her bowels are eradic at times. CS

## 2023-11-13 NOTE — HPI-TODAY'S VISIT:
11/13/2023 Ms. Correa is here for f/u of Cirrhosis. She had a MRI 1/2023 negative for ascites and HCC. She had an EGD for varices. This showed three large columns. Two bands were placed with incomplete eradication. She had a repeat in March that again was incomplete and recommended repeat. She is on atenolol. Her reflux is well controlled. She has abdominal swelling on and off. This improves after a BM. She tries to avoid salt. She denies any bleeding. She gets confused at times. She is on xifaxan BID. She is not taking lactulose. Her bowels are eradic at times. CS

## 2024-01-22 NOTE — PHYSICAL EXAM GASTROINTESTINAL
Abdomen , soft, nontender, nondistended , no guarding or rigidity , no masses palpable , normal bowel sounds , Liver and Spleen , no hepatosplenomegaly , liver nontender , spleen not palpable 10

## 2024-07-11 ENCOUNTER — OFFICE VISIT (OUTPATIENT)
Dept: URBAN - NONMETROPOLITAN AREA CLINIC 13 | Facility: CLINIC | Age: 64
End: 2024-07-11

## 2024-07-11 RX ORDER — LACTULOSE 10 G/15ML
45 ML SOLUTION ORAL THREE TIMES A DAY
Qty: 4050 ML | Refills: 11 | Status: ACTIVE | COMMUNITY

## 2024-07-11 RX ORDER — RIFAXIMIN 550 MG/1
1 TABLET TABLET ORAL TWICE A DAY
Qty: 180 TABLET | Refills: 3 | Status: ACTIVE | COMMUNITY

## 2024-07-11 RX ORDER — RIFAXIMIN 550 MG/1
TAKE 1 TABLET BY MOUTH TWICE (2) DAILY TABLET ORAL
Qty: 180 TABLET | Refills: 3 | Status: ACTIVE | COMMUNITY

## 2024-07-11 RX ORDER — DIAZEPAM 2 MG/1
TAKE 1 TABLET BY ORAL ROUTE 30 MINS PRIOR TO MRI TABLET ORAL 2
Qty: 1 | Refills: 0 | Status: ACTIVE | COMMUNITY

## 2024-07-11 RX ORDER — RIFAXIMIN 550 MG/1
1 TABLET TABLET ORAL TWICE A DAY
Qty: 180 TABLET | Refills: 3 | OUTPATIENT

## 2024-07-11 RX ORDER — TIZANIDINE HYDROCHLORIDE 4 MG/1
CAPSULE ORAL
Qty: 0 | Refills: 0 | COMMUNITY
Start: 1900-01-01

## 2024-07-11 RX ORDER — ATENOLOL 50 MG/1
TAKE 1 TABLET (50 MG) BY ORAL ROUTE 2 TIMES PER DAY TABLET ORAL 2
Qty: 0 | Refills: 0 | COMMUNITY
Start: 1900-01-01

## 2024-07-11 RX ORDER — LACTULOSE 10 G/15ML
45 ML SOLUTION ORAL THREE TIMES A DAY
Qty: 4050 ML | Refills: 11 | OUTPATIENT

## 2024-07-11 RX ORDER — METFORMIN HYDROCHLORIDE 500 MG/1
TAKE 1 TABLET (500 MG) BY ORAL ROUTE 2 TIMES PER DAY WITH MORNING AND EVENING MEALS TABLET, COATED ORAL 2
Qty: 0 | Refills: 0 | COMMUNITY
Start: 1900-01-01

## 2024-07-11 RX ORDER — PROMETHAZINE HYDROCHLORIDE 25 MG/1
TABLET ORAL
Qty: 0 | Refills: 0 | COMMUNITY
Start: 1900-01-01

## 2024-07-11 RX ORDER — DIAZEPAM 2 MG/1
TAKE 1 TABLET BY ORAL ROUTE 30 MINS PRIOR TO MRI TABLET ORAL 2
Qty: 1 | Refills: 0

## 2024-07-11 RX ORDER — COLESTIPOL HYDROCHLORIDE 1 G/1
1 TABLET 30MINS PRIOR TO A MEAL TABLET, FILM COATED ORAL ONCE A DAY
Qty: 30 TABLET | Refills: 6 | OUTPATIENT

## 2024-07-11 RX ORDER — COLESTIPOL HYDROCHLORIDE 1 G/1
1 TABLET 30MINS PRIOR TO A MEAL TABLET, FILM COATED ORAL ONCE A DAY
Qty: 30 TABLET | Refills: 6 | Status: ACTIVE | COMMUNITY

## 2024-07-11 RX ORDER — OXYCODONE HYDROCHLORIDE 5 MG/1
CAPSULE ORAL
Qty: 0 | Refills: 0 | COMMUNITY
Start: 1900-01-01

## 2024-07-11 RX ORDER — FAMOTIDINE 40 MG/1
TAKE 1 TABLET BY ORAL ROUTE AT BEDTIME FOR 90 DAYS TABLET, FILM COATED ORAL ONCE A DAY
Qty: 90 TABLET | Refills: 3

## 2024-07-11 RX ORDER — DULOXETINE 60 MG/1
TAKE 2 CAPSULES (120 MG) BY ORAL ROUTE ONCE DAILY CAPSULE, DELAYED RELEASE PELLETS ORAL 1
Qty: 0 | Refills: 0 | COMMUNITY
Start: 1900-01-01

## 2024-07-11 RX ORDER — ZOLPIDEM TARTRATE 10 MG/1
TAKE 1 TABLET (10 MG) BY ORAL ROUTE ONCE DAILY AT BEDTIME TABLET, FILM COATED ORAL 1
Qty: 0 | Refills: 0 | COMMUNITY
Start: 1900-01-01

## 2024-07-11 RX ORDER — FAMOTIDINE 40 MG/1
TAKE 1 TABLET BY ORAL ROUTE AT BEDTIME FOR 90 DAYS TABLET, FILM COATED ORAL ONCE A DAY
Qty: 90 TABLET | Refills: 3 | Status: ACTIVE | COMMUNITY

## 2024-07-11 RX ORDER — ROPINIROLE HYDROCHLORIDE 0.25 MG/1
TAKE 1 TABLET (0.25 MG) BY ORAL ROUTE 1-3 HOURS BEFORE BEDTIME TABLET, FILM COATED ORAL
Qty: 1 | Refills: 0 | COMMUNITY
Start: 1900-01-01

## 2024-07-11 NOTE — HPI-TODAY'S VISIT:
7/11/2024 Ms. Correa is here for f/u of Cirrhosis. She had a MRI 1/2023 negative for ascites and HCC. She had an EGD for varices. This showed three large columns. Two bands were placed with incomplete eradication. She had a repeat in March that again was incomplete and recommended repeat. She is on atenolol. Her reflux is well controlled. She has abdominal swelling on and off. This improves after a BM. She tries to avoid salt. She denies any bleeding. She gets confused at times. She is on xifaxan BID. She is not taking lactulose. Her bowels are eradic at times. CS

## 2024-07-11 NOTE — HPI-OTHER HISTORIES
History Of Present Illness    4/2019 Ms. 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 She is a previous patient of Dr. Abdalla and she has been seeing a hepatologist at [facility] every 6 months. She has been getting MRI's every 6 months. Her last MRI was in January with dysplastic appearing nodules. These have been stable. EGD 2018 with trace varices, retained food in her stomach, and portal HTN. She is taking protonix 40mg BID and zantac. Her reflux well controlled. She denies any nausea or vomiting. She denies any ascites. She denies any free bleeding. She had mental changes with being forgetful and sedated in the morning. She had been taking Ambien. This is resolved. She is not taking lactulose.  She has a history of colon cancer in 2011 with resection and chemo. Last colonoscopy was 2018 and normal except for diverticulosis. She is having diarrhea every meal. Imodium is not helping.  CS  6/17/2019 Ms. Correa is here for f/u of Cirrhosis. She is well compensated. She had an MRI with stable dysplastic appearing nodules. Her reflux is well controleld on Protonix 40mg BID. She on atenolol for trace varices. She denies any increase in bleeding or ascites. She has some mild confusion thought to be med related. She was having diarrhea and given colestipol. This made her sick. She is now having constipation. CS  September 23, 2019 Ms. Dunaway is here for f/u of Cirrhosis. Her MRI ni June was stable and due in December. She denies any reflux since taking protonix 40mg BID. She denies any increase in bleeding. She has a sore on her shin that has been present for several months. She saw her PCP with a bx. She has had some all over swelling. Her PCP started her on a diuretic. She has been taking this for a month. Her potassium was a little low in June and has not been rechecked. She was having minor confusion at her last OV. She denies any further issues. She has constipation to diarrhea. She is not taking anything at this time. She is having some LLQ pain after eating. She was given ibuprofen by her PCP. She was given thyroid medication at her last PCP OV. She is not taking this.  She has had some numbness and tingling in her hands. She was told this was coming from her neck. She is seeing a surgeon next month. CS  February 24, 2020 Ms. Correa is here for f/u of Cirrhosis. She had a MRI in December that was stable. Her reflux is well controlled with PPI daily. She has mild breakthrough if she eats late. She denies any increase in bleeding. She denies any abdominal swelling. Her bowels are now normal. She does not need the colestipol or lactulose. She is having surgery next month on her neck. She overall is feeling well. She has not followed up with her PCP regarding her thyroid. CS   10/7/2020 Ms. Correa is here for f/u of Cirrhosis. She had surgery on her neck since her last OV. She is back on pain medication. She is starting to feel better. She is having some worsening reflux with food coming back up when she lays down. She is having some nausea. She is having some RUQ pain intermittently. She is due for MRI for HCC and surveillance of a dysplastic nodule. She denies any swelling, increase in bleeding, or mental changes. Her bowels are currently normal without lactulose. CS   11/18/2020 Ms. Correa is here for f/u of Cirrhosis. She had surgery on her neck and was started on pain medication. This made her reflux worse. She was started on pepcid. She was unable to take this because she felt it made her swell. She is no longer having this symptoms. She does however had a lot of bloating and gas. She is no longer taking lactulose and having some constipation. She had her MRI 11/12 for HCC and surveillance of a dysplastic nodule this was stable. She denies any swelling, increase in bleeding, or mental changes. She is due for colonoscopy next year. CS   2/25/2021 Ms. Correa is here for f/u of Cirrhosis and colon cancer surgery. She is out of her pain medication today and having a lot of nerve pain down her legs. This makes it hard to walk at times. Her reflux is well controlled. Her bowels are more normal without lactulose or colestipol. if she gets backed up she will take lactulose with relief. She denies any ascites. She denies any bleeding. She denies any mental changes. She had her MRI 11/12 for HCC and surveillance of a dysplastic nodule this was stable. Overall, she is feeling well. CS   5/26/2021 Ms. Correa is here for f/u of Cirrhosis and colonoscopy. She continues to have pain but this is fairly well controlled with pain medication. She tries to limit how much she takes. She does have some minor fatigue from this. She denies any confusion or sedation. Her reflux is well controlled. Her bowels are more normal with lactulose prn. She has bloating at times but not too bothersome.  She denies any ascites. She denies any bleeding. She denies any mental changes. She had her MRI 11/12 for HCC and surveillance of a dysplastic nodule this was stable. She is due for repeat. Her repeat colonoscopy showed a 4mm inflammatory polyp in the rectum. Overall, she is feeling well. CS 0  10/4/2021 Ms. Correa is here for f/u of Cirrhosis. Since her last OV, she fell and fractured her patella and bruised her right eye. She was taking to the ER and had a negative head CT. She is making a little trouble getting around with her knee brace but doing ok. She denies any ascites. She has occassional fluid build up everywhere that her PCP has given  her lasix and potassium. She denies any constipation at this time. She will take lactulose if this happens and it works well. She denies any mental changes. She denies is bleeding. Her MRI this summer was negative for HCC and due in December. CS   3/7/2022 Ms. Correa is here for f/u of Cirrhosis. She had a MRI last month negative for ascites and HCC. She has been having some increase in bloating after eating and increase in gas. She denies any constipation at this time and has been having more looser urgent stools. She is not taking the lactulose. She denies any mental changes. She denies is bleeding. Her  is having some back issues and having testing.  CS  6/8/2022 Ms. Correa is here for f/u of Cirrhosis. She had a MRI last month negative for ascites and HCC. She has been having some increase in bloating after eating and increase in gas. She denies any constipation at this time and has been having more looser urgent stools. She is not taking the lactulose. She denies any mental changes. She denies is bleeding. Her  is having some back issues and having testing.  CS  8/31/2022 Ms. Correa is here for f/u of Cirrhosis. She had a MRI 2/2022 negative for ascites and HCC. She has been having RUQ pain after eating. This has gotten worse since her last OV. She has also had more nerve issues and on gabapentin. She is not taking it as rx.  She denies any constipation at this time. She was having some looser stool. She is now on xifaxan and this is helping. She denies any mental change. She is not taking the lactulose. She denies is bleeding. CS  9/2022 MRI: Cirrhosis, negative for HCC  11/18/2022 EGD: 3 columns of non-bleeding grade III varices, lower third, two bands were placed with incomplete eradication of varices. Recommend repeat in 3 months  1/11/2023 Ms. Correa is here for f/u of Cirrhosis. She had a MRI 9/2022 negative for ascites and HCC. She had an EGD for varices. This showed three large columns. Two bands were placed with incomplete eradication. She also had some benign gastric polyps. She was having some looser stool. Xifaxan has helped and now its back to being loose. She has been on colestipol in the past with good result. She denies any mental change. She is not taking the lactulose. She denies is bleeding. CS  5/10/2023 Ms. Correa is here for f/u of Cirrhosis. She had a MRI 1/2023 negative for ascites and HCC. She had an EGD for varices. This showed three large columns. Two bands were placed with incomplete eradication. She had a repeat in March that again was incomplete and recommended repeat. She is on atenolol. Her reflux is well controlled. She has abdominal swelling on and off. This improves after a BM. She tries to avoid salt. She denies any bleeding. She gets confused at times. She is on xifaxan BID. She is not taking lactulose. Her bowels are eradic at times. CS

## 2024-08-29 ENCOUNTER — LAB OUTSIDE AN ENCOUNTER (OUTPATIENT)
Dept: URBAN - NONMETROPOLITAN AREA CLINIC 13 | Facility: CLINIC | Age: 64
End: 2024-08-29

## 2024-08-29 ENCOUNTER — LAB OUTSIDE AN ENCOUNTER (OUTPATIENT)
Dept: URBAN - NONMETROPOLITAN AREA CLINIC 2 | Facility: CLINIC | Age: 64
End: 2024-08-29

## 2024-08-29 ENCOUNTER — OFFICE VISIT (OUTPATIENT)
Dept: URBAN - NONMETROPOLITAN AREA CLINIC 13 | Facility: CLINIC | Age: 64
End: 2024-08-29
Payer: COMMERCIAL

## 2024-08-29 VITALS
SYSTOLIC BLOOD PRESSURE: 131 MMHG | DIASTOLIC BLOOD PRESSURE: 73 MMHG | BODY MASS INDEX: 34.29 KG/M2 | WEIGHT: 205.8 LBS | HEART RATE: 71 BPM | HEIGHT: 65 IN

## 2024-08-29 DIAGNOSIS — I85.00 ESOPHAGEAL VARICES DETERMINED BY ENDOSCOPY: ICD-10-CM

## 2024-08-29 DIAGNOSIS — Z85.038 PERSONAL HISTORY OF COLON CANCER: ICD-10-CM

## 2024-08-29 DIAGNOSIS — R19.4 BOWEL HABIT CHANGES: ICD-10-CM

## 2024-08-29 DIAGNOSIS — K76.89: ICD-10-CM

## 2024-08-29 DIAGNOSIS — K21.9 GERD (GASTROESOPHAGEAL REFLUX DISEASE): ICD-10-CM

## 2024-08-29 DIAGNOSIS — K74.60 CIRRHOSIS: ICD-10-CM

## 2024-08-29 DIAGNOSIS — R10.9 ABDOMINAL PAIN: ICD-10-CM

## 2024-08-29 LAB
A/G RATIO: 1
ALBUMIN: 3.9
ALKALINE PHOSPHATASE: 159
ALT (SGPT): 14
ANION GAP: 13
AST (SGOT): 34
BASOPHILS AUTOMATED ABSOLUTE COUNT: 0
BASOPHILS RELATIVE PERCENT: 0.6
BILIRUBIN DIRECT: 0.3
BILIRUBIN TOTAL: 1.2
BILIRUBIN, INDIRECT: 0.9
BLOOD UREA NITROGEN: 5
BUN / CREAT RATIO: 7
CALCIUM: 9.7
CHLORIDE: 98
CO2: 31
CREATININE, SERUM: 0.69
EGFR (CKD-EPI): >60
EOSINOPHILS AUTOMATED ABSOLUTE COUNT: 0.2
EOSINOPHILS RELATIVE PERCENT: 3.8
GLUCOSE: 88
HEMATOCRIT: 39.1
HEMOGLOBIN: 13
INR: 1.19
LYMPHOCYTES AUTOMATED ABSOLUTE COUNT: 1.3
LYMPHOCYTES RELATIVE PERCENT: 25.2
MEAN CORPUSCULAR HEMOGLOBIN CONC: 33.4
MEAN CORPUSCULAR HEMOGLOBIN: 28.8
MEAN CORPUSCULAR VOLUME: 86.4
MEAN PLATELET VOLUME: 8.8
MONOCYTES AUTOMATED ABSOLUTE COUNT: 0.5
MONOCYTES RELATIVE PERCENT: 9.5
NEUTROPHILS AUTOMATED ABSOLUTE: 3
NEUTROPHILS RELATIVE PERCENT: 60.9
PLATELET COUNT: 115
POTASSIUM: 4.1
PROTEIN TOTAL: 7.8
PROTHROMBIN TIME: 13.9
RED BLOOD CELL COUNT: 4.52
RED CELL DISTRIBUTION WIDTH: 16.1
SODIUM: 138
WHITE BLOOD CELL COUNT: 5

## 2024-08-29 PROCEDURE — 99215 OFFICE O/P EST HI 40 MIN: CPT | Performed by: NURSE PRACTITIONER

## 2024-08-29 RX ORDER — CHLORTHALIDONE 25 MG/1
TAKE 1 TABLET BY MOUTH EVERY DAY TABLET ORAL
Qty: 30 EACH | Refills: 4 | Status: ACTIVE | COMMUNITY

## 2024-08-29 RX ORDER — LACTULOSE 10 G/15ML
15 ML AS NEEDED SOLUTION ORAL THREE TIMES A DAY
Qty: 1350 ML | Refills: 11 | OUTPATIENT

## 2024-08-29 RX ORDER — FAMOTIDINE 40 MG/1
TAKE 1 TABLET BY ORAL ROUTE AT BEDTIME FOR 90 DAYS TABLET, FILM COATED ORAL ONCE A DAY
Qty: 90 TABLET | Refills: 3

## 2024-08-29 RX ORDER — METFORMIN HYDROCHLORIDE 1000 MG/1
TAKE 1 TABLET BY MOUTH TWICE DAILY TABLET, FILM COATED ORAL
Qty: 60 EACH | Refills: 6 | Status: ACTIVE | COMMUNITY

## 2024-08-29 RX ORDER — DIAZEPAM 2 MG/1
TAKE 1 TABLET BY ORAL ROUTE 30 MINS PRIOR TO MRI TABLET ORAL 2
Qty: 1 | Refills: 0 | COMMUNITY

## 2024-08-29 RX ORDER — COLESTIPOL HYDROCHLORIDE 1 G/1
1 TABLET 30MINS PRIOR TO A MEAL TABLET, FILM COATED ORAL ONCE A DAY
Qty: 30 TABLET | Refills: 6 | OUTPATIENT

## 2024-08-29 RX ORDER — DIAZEPAM 2 MG/1
TAKE 1 TABLET BY ORAL ROUTE 30 MINS PRIOR TO MRI TABLET ORAL 2
Qty: 1 | Refills: 0

## 2024-08-29 RX ORDER — RISPERIDONE 0.5 MG/1
TAKE 1 TABLET BY MOUTH EVERY NIGHT AT BEDTIME TABLET ORAL
Qty: 30 EACH | Refills: 0 | Status: ACTIVE | COMMUNITY

## 2024-08-29 RX ORDER — COLESTIPOL HYDROCHLORIDE 1 G/1
1 TABLET 30MINS PRIOR TO A MEAL TABLET, FILM COATED ORAL ONCE A DAY
Qty: 30 TABLET | Refills: 6 | Status: ACTIVE | COMMUNITY

## 2024-08-29 RX ORDER — LACTULOSE 10 G/15ML
45 ML SOLUTION ORAL THREE TIMES A DAY
Qty: 4050 ML | Refills: 11 | COMMUNITY

## 2024-08-29 RX ORDER — DULOXETINE HYDROCHLORIDE 60 MG/1
TAKE 1 CAPSULE BY MOUTH EVERY DAY CAPSULE, DELAYED RELEASE PELLETS ORAL
Qty: 90 EACH | Refills: 1 | Status: ACTIVE | COMMUNITY

## 2024-08-29 RX ORDER — POTASSIUM CHLORIDE 20 MEQ/1
TAKE 1 TABLET BY MOUTH EVERY DAY TABLET, EXTENDED RELEASE ORAL
Qty: 30 EACH | Refills: 4 | Status: ACTIVE | COMMUNITY

## 2024-08-29 RX ORDER — RIFAXIMIN 550 MG/1
1 TABLET TABLET ORAL TWICE A DAY
Qty: 180 TABLET | Refills: 3 | OUTPATIENT

## 2024-08-29 RX ORDER — ATENOLOL 50 MG/1
TABLET ORAL
Qty: 180 TABLET | Status: ACTIVE | COMMUNITY

## 2024-08-29 RX ORDER — RIFAXIMIN 550 MG/1
TAKE 1 TABLET BY MOUTH TWICE (2) DAILY TABLET ORAL
Qty: 180 TABLET | Refills: 3 | COMMUNITY

## 2024-08-29 RX ORDER — OXYCODONE HYDROCHLORIDE 15 MG/1
TABLET ORAL
Qty: 120 TABLET | Status: ACTIVE | COMMUNITY

## 2024-08-29 RX ORDER — FAMOTIDINE 40 MG/1
TAKE 1 TABLET BY ORAL ROUTE AT BEDTIME FOR 90 DAYS TABLET, FILM COATED ORAL ONCE A DAY
Qty: 90 TABLET | Refills: 3 | COMMUNITY

## 2024-08-29 NOTE — HPI-OTHER HISTORIES
History Of Present Illness    4/2019 Ms. 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 She is a previous patient of Dr. Abdalla and she has been seeing a hepatologist at [facility] every 6 months. She has been getting MRI's every 6 months. Her last MRI was in January with dysplastic appearing nodules. These have been stable. EGD 2018 with trace varices, retained food in her stomach, and portal HTN. She is taking protonix 40mg BID and zantac. Her reflux well controlled. She denies any nausea or vomiting. She denies any ascites. She denies any free bleeding. She had mental changes with being forgetful and sedated in the morning. She had been taking Ambien. This is resolved. She is not taking lactulose.  She has a history of colon cancer in 2011 with resection and chemo. Last colonoscopy was 2018 and normal except for diverticulosis. She is having diarrhea every meal. Imodium is not helping.  CS  6/17/2019 Ms. Correa is here for f/u of Cirrhosis. She is well compensated. She had an MRI with stable dysplastic appearing nodules. Her reflux is well controleld on Protonix 40mg BID. She on atenolol for trace varices. She denies any increase in bleeding or ascites. She has some mild confusion thought to be med related. She was having diarrhea and given colestipol. This made her sick. She is now having constipation. CS  September 23, 2019 Ms. Dunaway is here for f/u of Cirrhosis. Her MRI ni June was stable and due in December. She denies any reflux since taking protonix 40mg BID. She denies any increase in bleeding. She has a sore on her shin that has been present for several months. She saw her PCP with a bx. She has had some all over swelling. Her PCP started her on a diuretic. She has been taking this for a month. Her potassium was a little low in June and has not been rechecked. She was having minor confusion at her last OV. She denies any further issues. She has constipation to diarrhea. She is not taking anything at this time. She is having some LLQ pain after eating. She was given ibuprofen by her PCP. She was given thyroid medication at her last PCP OV. She is not taking this.  She has had some numbness and tingling in her hands. She was told this was coming from her neck. She is seeing a surgeon next month. CS  February 24, 2020 Ms. Correa is here for f/u of Cirrhosis. She had a MRI in December that was stable. Her reflux is well controlled with PPI daily. She has mild breakthrough if she eats late. She denies any increase in bleeding. She denies any abdominal swelling. Her bowels are now normal. She does not need the colestipol or lactulose. She is having surgery next month on her neck. She overall is feeling well. She has not followed up with her PCP regarding her thyroid. CS   10/7/2020 Ms. Correa is here for f/u of Cirrhosis. She had surgery on her neck since her last OV. She is back on pain medication. She is starting to feel better. She is having some worsening reflux with food coming back up when she lays down. She is having some nausea. She is having some RUQ pain intermittently. She is due for MRI for HCC and surveillance of a dysplastic nodule. She denies any swelling, increase in bleeding, or mental changes. Her bowels are currently normal without lactulose. CS   11/18/2020 Ms. Correa is here for f/u of Cirrhosis. She had surgery on her neck and was started on pain medication. This made her reflux worse. She was started on pepcid. She was unable to take this because she felt it made her swell. She is no longer having this symptoms. She does however had a lot of bloating and gas. She is no longer taking lactulose and having some constipation. She had her MRI 11/12 for HCC and surveillance of a dysplastic nodule this was stable. She denies any swelling, increase in bleeding, or mental changes. She is due for colonoscopy next year. CS   2/25/2021 Ms. Correa is here for f/u of Cirrhosis and colon cancer surgery. She is out of her pain medication today and having a lot of nerve pain down her legs. This makes it hard to walk at times. Her reflux is well controlled. Her bowels are more normal without lactulose or colestipol. if she gets backed up she will take lactulose with relief. She denies any ascites. She denies any bleeding. She denies any mental changes. She had her MRI 11/12 for HCC and surveillance of a dysplastic nodule this was stable. Overall, she is feeling well. CS   5/26/2021 Ms. Correa is here for f/u of Cirrhosis and colonoscopy. She continues to have pain but this is fairly well controlled with pain medication. She tries to limit how much she takes. She does have some minor fatigue from this. She denies any confusion or sedation. Her reflux is well controlled. Her bowels are more normal with lactulose prn. She has bloating at times but not too bothersome.  She denies any ascites. She denies any bleeding. She denies any mental changes. She had her MRI 11/12 for HCC and surveillance of a dysplastic nodule this was stable. She is due for repeat. Her repeat colonoscopy showed a 4mm inflammatory polyp in the rectum. Overall, she is feeling well. CS 0  10/4/2021 Ms. Correa is here for f/u of Cirrhosis. Since her last OV, she fell and fractured her patella and bruised her right eye. She was taking to the ER and had a negative head CT. She is making a little trouble getting around with her knee brace but doing ok. She denies any ascites. She has occassional fluid build up everywhere that her PCP has given  her lasix and potassium. She denies any constipation at this time. She will take lactulose if this happens and it works well. She denies any mental changes. She denies is bleeding. Her MRI this summer was negative for HCC and due in December. CS   3/7/2022 Ms. Correa is here for f/u of Cirrhosis. She had a MRI last month negative for ascites and HCC. She has been having some increase in bloating after eating and increase in gas. She denies any constipation at this time and has been having more looser urgent stools. She is not taking the lactulose. She denies any mental changes. She denies is bleeding. Her  is having some back issues and having testing.  CS  6/8/2022 Ms. Correa is here for f/u of Cirrhosis. She had a MRI last month negative for ascites and HCC. She has been having some increase in bloating after eating and increase in gas. She denies any constipation at this time and has been having more looser urgent stools. She is not taking the lactulose. She denies any mental changes. She denies is bleeding. Her  is having some back issues and having testing.  CS  8/31/2022 Ms. Correa is here for f/u of Cirrhosis. She had a MRI 2/2022 negative for ascites and HCC. She has been having RUQ pain after eating. This has gotten worse since her last OV. She has also had more nerve issues and on gabapentin. She is not taking it as rx.  She denies any constipation at this time. She was having some looser stool. She is now on xifaxan and this is helping. She denies any mental change. She is not taking the lactulose. She denies is bleeding. CS  9/2022 MRI: Cirrhosis, negative for HCC  11/18/2022 EGD: 3 columns of non-bleeding grade III varices, lower third, two bands were placed with incomplete eradication of varices. Recommend repeat in 3 months  1/11/2023 Ms. Correa is here for f/u of Cirrhosis. She had a MRI 9/2022 negative for ascites and HCC. She had an EGD for varices. This showed three large columns. Two bands were placed with incomplete eradication. She also had some benign gastric polyps. She was having some looser stool. Xifaxan has helped and now its back to being loose. She has been on colestipol in the past with good result. She denies any mental change. She is not taking the lactulose. She denies is bleeding. CS  5/10/2023 Ms. Correa is here for f/u of Cirrhosis. She had a MRI 1/2023 negative for ascites and HCC. She had an EGD for varices. This showed three large columns. Two bands were placed with incomplete eradication. She had a repeat in March that again was incomplete and recommended repeat. She is on atenolol. Her reflux is well controlled. She has abdominal swelling on and off. This improves after a BM. She tries to avoid salt. She denies any bleeding. She gets confused at times. She is on xifaxan BID. She is not taking lactulose. Her bowels are eradic at times. CS  7/7/2023 EGD: 3 columns of nonbleeding varices, three bands placed, GAVE

## 2024-08-29 NOTE — HPI-TODAY'S VISIT:
8/29/2024 Ms. Correa is here for f/u of Cirrhosis. She had a MRI 7/2023 negative for ascites and HCC. She had an EGD for varices 7/2023. This showed three large columns. She is on atenolol. Her reflux is well controlled. She has abdominal swelling on and off. This improves after a BM. She has constipation to diarrhea. She is not taking anything for this. She has been on more pain meds after fx her wrist. She has had more confusion. She is not taking xifaxan or lactulose. She denies any bleeding. CS

## 2024-09-03 ENCOUNTER — TELEPHONE ENCOUNTER (OUTPATIENT)
Dept: URBAN - NONMETROPOLITAN AREA CLINIC 2 | Facility: CLINIC | Age: 64
End: 2024-09-03

## 2024-09-04 ENCOUNTER — TELEPHONE ENCOUNTER (OUTPATIENT)
Dept: URBAN - NONMETROPOLITAN AREA CLINIC 2 | Facility: CLINIC | Age: 64
End: 2024-09-04

## 2024-09-09 ENCOUNTER — TELEPHONE ENCOUNTER (OUTPATIENT)
Dept: URBAN - NONMETROPOLITAN AREA CLINIC 13 | Facility: CLINIC | Age: 64
End: 2024-09-09

## 2024-12-11 ENCOUNTER — TELEPHONE ENCOUNTER (OUTPATIENT)
Dept: URBAN - METROPOLITAN AREA CLINIC 6 | Facility: CLINIC | Age: 64
End: 2024-12-11

## 2024-12-13 ENCOUNTER — OFFICE VISIT (OUTPATIENT)
Dept: URBAN - METROPOLITAN AREA MEDICAL CENTER 1 | Facility: MEDICAL CENTER | Age: 64
End: 2024-12-13
Payer: MEDICARE

## 2024-12-13 ENCOUNTER — LAB OUTSIDE AN ENCOUNTER (OUTPATIENT)
Dept: URBAN - NONMETROPOLITAN AREA CLINIC 2 | Facility: CLINIC | Age: 64
End: 2024-12-13

## 2024-12-13 DIAGNOSIS — D12.5 ADENOMA OF SIGMOID COLON: ICD-10-CM

## 2024-12-13 DIAGNOSIS — I85.10 ESOPH VARICE OTHER DIS: ICD-10-CM

## 2024-12-13 DIAGNOSIS — K74.60 ADVANCED CIRRHOSIS: ICD-10-CM

## 2024-12-13 DIAGNOSIS — Z85.038 H/O COLON CANCER, STAGE I: ICD-10-CM

## 2024-12-13 PROCEDURE — 43244 EGD VARICES LIGATION: CPT | Performed by: INTERNAL MEDICINE

## 2024-12-13 PROCEDURE — 45385 COLONOSCOPY W/LESION REMOVAL: CPT | Performed by: INTERNAL MEDICINE

## 2024-12-13 PROCEDURE — 43239 EGD BIOPSY SINGLE/MULTIPLE: CPT | Performed by: INTERNAL MEDICINE

## 2025-01-06 ENCOUNTER — OFFICE VISIT (OUTPATIENT)
Dept: URBAN - NONMETROPOLITAN AREA CLINIC 13 | Facility: CLINIC | Age: 65
End: 2025-01-06

## 2025-02-20 ENCOUNTER — OFFICE VISIT (OUTPATIENT)
Dept: URBAN - NONMETROPOLITAN AREA CLINIC 13 | Facility: CLINIC | Age: 65
End: 2025-02-20
Payer: COMMERCIAL

## 2025-02-20 VITALS
DIASTOLIC BLOOD PRESSURE: 80 MMHG | HEIGHT: 65 IN | HEART RATE: 77 BPM | BODY MASS INDEX: 34.82 KG/M2 | SYSTOLIC BLOOD PRESSURE: 159 MMHG | WEIGHT: 209 LBS

## 2025-02-20 DIAGNOSIS — K74.60 CIRRHOSIS: ICD-10-CM

## 2025-02-20 DIAGNOSIS — Z85.038 PERSONAL HISTORY OF COLON CANCER: ICD-10-CM

## 2025-02-20 DIAGNOSIS — R10.84: ICD-10-CM

## 2025-02-20 DIAGNOSIS — R19.4 BOWEL HABIT CHANGES: ICD-10-CM

## 2025-02-20 DIAGNOSIS — R11.10 RECURRENT VOMITING: ICD-10-CM

## 2025-02-20 DIAGNOSIS — I85.00 ESOPHAGEAL VARICES DETERMINED BY ENDOSCOPY: ICD-10-CM

## 2025-02-20 DIAGNOSIS — K76.89: ICD-10-CM

## 2025-02-20 DIAGNOSIS — K21.9 GERD (GASTROESOPHAGEAL REFLUX DISEASE): ICD-10-CM

## 2025-02-20 LAB
A/G RATIO: 0.9
ALBUMIN: 4
ALKALINE PHOSPHATASE: 207
ALT (SGPT): 17
ANION GAP: 14
AST (SGOT): 37
BILIRUBIN TOTAL: 1.5
BLOOD UREA NITROGEN: 7
BUN / CREAT RATIO: 11
CALCIUM: 10.1
CHLORIDE: 96
CO2: 35
CREATININE, SERUM: 0.64
EGFR (CKD-EPI): >60
GLUCOSE: 114
HEMATOCRIT: 46.8
HEMOGLOBIN: 15.6
INR: 1.28
MEAN CORPUSCULAR HEMOGLOBIN CONC: 33.3
MEAN CORPUSCULAR HEMOGLOBIN: 29.7
MEAN CORPUSCULAR VOLUME: 89
MEAN PLATELET VOLUME: 8.7
PLATELET COUNT: 122
POTASSIUM: 3.9
PROTEIN TOTAL: 8.3
PROTHROMBIN TIME: 14.3
RED BLOOD CELL COUNT: 5.26
RED CELL DISTRIBUTION WIDTH: 20.4
SODIUM: 141
WHITE BLOOD CELL COUNT: 6.5

## 2025-02-20 PROCEDURE — 99214 OFFICE O/P EST MOD 30 MIN: CPT | Performed by: NURSE PRACTITIONER

## 2025-02-20 RX ORDER — COLESTIPOL HYDROCHLORIDE 1 G/1
1 TABLET 30MINS PRIOR TO A MEAL TABLET, FILM COATED ORAL ONCE A DAY
Qty: 30 TABLET | Refills: 6 | OUTPATIENT

## 2025-02-20 RX ORDER — RIFAXIMIN 550 MG/1
1 TABLET TABLET ORAL TWICE A DAY
Qty: 180 TABLET | Refills: 3 | Status: ACTIVE | COMMUNITY

## 2025-02-20 RX ORDER — ATENOLOL 50 MG/1
TABLET ORAL
Qty: 180 TABLET | Status: ACTIVE | COMMUNITY

## 2025-02-20 RX ORDER — FAMOTIDINE 40 MG/1
TAKE 1 TABLET BY ORAL ROUTE AT BEDTIME FOR 90 DAYS TABLET, FILM COATED ORAL ONCE A DAY
Qty: 90 TABLET | Refills: 3

## 2025-02-20 RX ORDER — METFORMIN HYDROCHLORIDE 1000 MG/1
TAKE 1 TABLET BY MOUTH TWICE DAILY TABLET, FILM COATED ORAL
Qty: 60 EACH | Refills: 6 | Status: ACTIVE | COMMUNITY

## 2025-02-20 RX ORDER — LACTULOSE 10 G/15ML
15 ML AS NEEDED SOLUTION ORAL THREE TIMES A DAY
Qty: 1350 ML | Refills: 11 | Status: ACTIVE | COMMUNITY

## 2025-02-20 RX ORDER — POTASSIUM CHLORIDE 20 MEQ/1
TAKE 1 TABLET BY MOUTH EVERY DAY TABLET, EXTENDED RELEASE ORAL
Qty: 30 EACH | Refills: 4 | Status: ACTIVE | COMMUNITY

## 2025-02-20 RX ORDER — COLESTIPOL HYDROCHLORIDE 1 G/1
1 TABLET 30MINS PRIOR TO A MEAL TABLET, FILM COATED ORAL ONCE A DAY
Qty: 30 TABLET | Refills: 6 | Status: ACTIVE | COMMUNITY

## 2025-02-20 RX ORDER — RIFAXIMIN 550 MG/1
TAKE 1 TABLET BY MOUTH TWICE (2) DAILY TABLET ORAL
Qty: 180 TABLET | Refills: 3 | COMMUNITY

## 2025-02-20 RX ORDER — RIFAXIMIN 550 MG/1
1 TABLET TABLET ORAL TWICE A DAY
Qty: 180 TABLET | Refills: 3 | OUTPATIENT

## 2025-02-20 RX ORDER — OXYCODONE HYDROCHLORIDE 15 MG/1
TABLET ORAL
Qty: 120 TABLET | Status: ACTIVE | COMMUNITY

## 2025-02-20 RX ORDER — RISPERIDONE 0.5 MG/1
TAKE 1 TABLET BY MOUTH EVERY NIGHT AT BEDTIME TABLET ORAL
Qty: 30 EACH | Refills: 0 | Status: ACTIVE | COMMUNITY

## 2025-02-20 RX ORDER — LACTULOSE 10 G/15ML
15 ML AS NEEDED SOLUTION ORAL THREE TIMES A DAY
Qty: 1350 ML | Refills: 11 | OUTPATIENT

## 2025-02-20 RX ORDER — DIAZEPAM 2 MG/1
TAKE 1 TABLET BY ORAL ROUTE 30 MINS PRIOR TO MRI TABLET ORAL 2
Qty: 1 | Refills: 0 | Status: ACTIVE | COMMUNITY

## 2025-02-20 RX ORDER — DULOXETINE HYDROCHLORIDE 60 MG/1
TAKE 1 CAPSULE BY MOUTH EVERY DAY CAPSULE, DELAYED RELEASE PELLETS ORAL
Qty: 90 EACH | Refills: 1 | Status: ACTIVE | COMMUNITY

## 2025-02-20 RX ORDER — CHLORTHALIDONE 25 MG/1
TAKE 1 TABLET BY MOUTH EVERY DAY TABLET ORAL
Qty: 30 EACH | Refills: 4 | Status: ACTIVE | COMMUNITY

## 2025-02-20 RX ORDER — FAMOTIDINE 40 MG/1
TAKE 1 TABLET BY ORAL ROUTE AT BEDTIME FOR 90 DAYS TABLET, FILM COATED ORAL ONCE A DAY
Qty: 90 TABLET | Refills: 3 | Status: ACTIVE | COMMUNITY

## 2025-02-20 RX ORDER — TIRZEPATIDE 2.5 MG/.5ML
INJECT 2.5MG UNDER THE SKIN WEEKLY -- START 12/14 INJECTION, SOLUTION SUBCUTANEOUS
Qty: 2 MILLILITER | Refills: 0 | Status: ACTIVE | COMMUNITY

## 2025-02-20 RX ORDER — DIAZEPAM 2 MG/1
TAKE 1 TABLET BY ORAL ROUTE 30 MINS PRIOR TO MRI TABLET ORAL 2
Qty: 1 | Refills: 0

## 2025-02-20 NOTE — HPI-OTHER HISTORIES
History Of Present Illness    4/2019 Ms. 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 She is a previous patient of Dr. Abdalla and she has been seeing a hepatologist at [facility] every 6 months. She has been getting MRI's every 6 months. Her last MRI was in January with dysplastic appearing nodules. These have been stable. EGD 2018 with trace varices, retained food in her stomach, and portal HTN. She is taking protonix 40mg BID and zantac. Her reflux well controlled. She denies any nausea or vomiting. She denies any ascites. She denies any free bleeding. She had mental changes with being forgetful and sedated in the morning. She had been taking Ambien. This is resolved. She is not taking lactulose.  She has a history of colon cancer in 2011 with resection and chemo. Last colonoscopy was 2018 and normal except for diverticulosis. She is having diarrhea every meal. Imodium is not helping.  CS  6/17/2019 Ms. Correa is here for f/u of Cirrhosis. She is well compensated. She had an MRI with stable dysplastic appearing nodules. Her reflux is well controleld on Protonix 40mg BID. She on atenolol for trace varices. She denies any increase in bleeding or ascites. She has some mild confusion thought to be med related. She was having diarrhea and given colestipol. This made her sick. She is now having constipation. CS  September 23, 2019 Ms. Dunaway is here for f/u of Cirrhosis. Her MRI ni June was stable and due in December. She denies any reflux since taking protonix 40mg BID. She denies any increase in bleeding. She has a sore on her shin that has been present for several months. She saw her PCP with a bx. She has had some all over swelling. Her PCP started her on a diuretic. She has been taking this for a month. Her potassium was a little low in June and has not been rechecked. She was having minor confusion at her last OV. She denies any further issues. She has constipation to diarrhea. She is not taking anything at this time. She is having some LLQ pain after eating. She was given ibuprofen by her PCP. She was given thyroid medication at her last PCP OV. She is not taking this.  She has had some numbness and tingling in her hands. She was told this was coming from her neck. She is seeing a surgeon next month. CS  February 24, 2020 Ms. Correa is here for f/u of Cirrhosis. She had a MRI in December that was stable. Her reflux is well controlled with PPI daily. She has mild breakthrough if she eats late. She denies any increase in bleeding. She denies any abdominal swelling. Her bowels are now normal. She does not need the colestipol or lactulose. She is having surgery next month on her neck. She overall is feeling well. She has not followed up with her PCP regarding her thyroid. CS   10/7/2020 Ms. Correa is here for f/u of Cirrhosis. She had surgery on her neck since her last OV. She is back on pain medication. She is starting to feel better. She is having some worsening reflux with food coming back up when she lays down. She is having some nausea. She is having some RUQ pain intermittently. She is due for MRI for HCC and surveillance of a dysplastic nodule. She denies any swelling, increase in bleeding, or mental changes. Her bowels are currently normal without lactulose. CS   11/18/2020 Ms. Correa is here for f/u of Cirrhosis. She had surgery on her neck and was started on pain medication. This made her reflux worse. She was started on pepcid. She was unable to take this because she felt it made her swell. She is no longer having this symptoms. She does however had a lot of bloating and gas. She is no longer taking lactulose and having some constipation. She had her MRI 11/12 for HCC and surveillance of a dysplastic nodule this was stable. She denies any swelling, increase in bleeding, or mental changes. She is due for colonoscopy next year. CS   2/25/2021 Ms. Correa is here for f/u of Cirrhosis and colon cancer surgery. She is out of her pain medication today and having a lot of nerve pain down her legs. This makes it hard to walk at times. Her reflux is well controlled. Her bowels are more normal without lactulose or colestipol. if she gets backed up she will take lactulose with relief. She denies any ascites. She denies any bleeding. She denies any mental changes. She had her MRI 11/12 for HCC and surveillance of a dysplastic nodule this was stable. Overall, she is feeling well. CS   5/26/2021 Ms. Correa is here for f/u of Cirrhosis and colonoscopy. She continues to have pain but this is fairly well controlled with pain medication. She tries to limit how much she takes. She does have some minor fatigue from this. She denies any confusion or sedation. Her reflux is well controlled. Her bowels are more normal with lactulose prn. She has bloating at times but not too bothersome.  She denies any ascites. She denies any bleeding. She denies any mental changes. She had her MRI 11/12 for HCC and surveillance of a dysplastic nodule this was stable. She is due for repeat. Her repeat colonoscopy showed a 4mm inflammatory polyp in the rectum. Overall, she is feeling well. CS 0  10/4/2021 Ms. Correa is here for f/u of Cirrhosis. Since her last OV, she fell and fractured her patella and bruised her right eye. She was taking to the ER and had a negative head CT. She is making a little trouble getting around with her knee brace but doing ok. She denies any ascites. She has occassional fluid build up everywhere that her PCP has given  her lasix and potassium. She denies any constipation at this time. She will take lactulose if this happens and it works well. She denies any mental changes. She denies is bleeding. Her MRI this summer was negative for HCC and due in December. CS   3/7/2022 Ms. Correa is here for f/u of Cirrhosis. She had a MRI last month negative for ascites and HCC. She has been having some increase in bloating after eating and increase in gas. She denies any constipation at this time and has been having more looser urgent stools. She is not taking the lactulose. She denies any mental changes. She denies is bleeding. Her  is having some back issues and having testing.  CS  6/8/2022 Ms. Correa is here for f/u of Cirrhosis. She had a MRI last month negative for ascites and HCC. She has been having some increase in bloating after eating and increase in gas. She denies any constipation at this time and has been having more looser urgent stools. She is not taking the lactulose. She denies any mental changes. She denies is bleeding. Her  is having some back issues and having testing.  CS  8/31/2022 Ms. Correa is here for f/u of Cirrhosis. She had a MRI 2/2022 negative for ascites and HCC. She has been having RUQ pain after eating. This has gotten worse since her last OV. She has also had more nerve issues and on gabapentin. She is not taking it as rx.  She denies any constipation at this time. She was having some looser stool. She is now on xifaxan and this is helping. She denies any mental change. She is not taking the lactulose. She denies is bleeding. CS  9/2022 MRI: Cirrhosis, negative for HCC  11/18/2022 EGD: 3 columns of non-bleeding grade III varices, lower third, two bands were placed with incomplete eradication of varices. Recommend repeat in 3 months  1/11/2023 Ms. Correa is here for f/u of Cirrhosis. She had a MRI 9/2022 negative for ascites and HCC. She had an EGD for varices. This showed three large columns. Two bands were placed with incomplete eradication. She also had some benign gastric polyps. She was having some looser stool. Xifaxan has helped and now its back to being loose. She has been on colestipol in the past with good result. She denies any mental change. She is not taking the lactulose. She denies is bleeding. CS  5/10/2023 Ms. Correa is here for f/u of Cirrhosis. She had a MRI 1/2023 negative for ascites and HCC. She had an EGD for varices. This showed three large columns. Two bands were placed with incomplete eradication. She had a repeat in March that again was incomplete and recommended repeat. She is on atenolol. Her reflux is well controlled. She has abdominal swelling on and off. This improves after a BM. She tries to avoid salt. She denies any bleeding. She gets confused at times. She is on xifaxan BID. She is not taking lactulose. Her bowels are eradic at times. CS  7/7/2023 EGD: 3 columns of nonbleeding varices, three bands placed, GAVE  8/29/2024 Ms. Correa is here for f/u of Cirrhosis. She had a MRI 7/2023 negative for ascites and HCC. She had an EGD for varices 7/2023. This showed three large columns. She is on atenolol. Her reflux is well controlled. She has abdominal swelling on and off. This improves after a BM. She has constipation to diarrhea. She is not taking anything for this. She has been on more pain meds after fx her wrist. She has had more confusion. She is not taking xifaxan or lactulose. She denies any bleeding. CS  10/21/2024 MRI: no HCC  12/13/2024 EGD: Large varices, two bands placed Colonoscopy: prior end to side ileo colonic anastomosis in the ascending, small TA polyp at 30cm.

## 2025-02-20 NOTE — HPI-TODAY'S VISIT:
2/20/2025 Ms. Correa is here for f/u of Cirrhosis. She had a MRI 10/2024 negative for ascites and HCC. She had an EGD for varices 12/2024 with large varices with two bands placed. She is on atenolol. She denies any bleeding. She does have low iron and getting IV iron through Dr Juarez. Her reflux is no longer controlled. She is having an unsettled stomach worse at night. She has had some nausea and vomiting of food. She has been started on a shot for diabetes- she thinks it is ozempic. She has abdominal swelling on and off. This improves after a BM. She has constipation to diarrhea. She is on xifaxan and lactuloes prn. CS 21-Dec-2018

## 2025-02-21 ENCOUNTER — TELEPHONE ENCOUNTER (OUTPATIENT)
Dept: URBAN - NONMETROPOLITAN AREA CLINIC 2 | Facility: CLINIC | Age: 65
End: 2025-02-21

## 2025-02-25 LAB — AFP-TUMOR MARKER: 6.9

## 2025-03-11 ENCOUNTER — TELEPHONE ENCOUNTER (OUTPATIENT)
Dept: URBAN - NONMETROPOLITAN AREA CLINIC 2 | Facility: CLINIC | Age: 65
End: 2025-03-11

## 2025-03-11 PROBLEM — 13920009: Status: ACTIVE | Noted: 2025-03-11

## 2025-03-12 ENCOUNTER — CLAIMS CREATED FROM THE CLAIM WINDOW (OUTPATIENT)
Dept: URBAN - METROPOLITAN AREA MEDICAL CENTER 1 | Facility: MEDICAL CENTER | Age: 65
End: 2025-03-12
Payer: COMMERCIAL

## 2025-03-12 DIAGNOSIS — K74.69 CIRRHOSIS, CRYPTOGENIC: ICD-10-CM

## 2025-03-12 DIAGNOSIS — K76.82 ACUTE HEPATIC ENCEPHALOPATHY: ICD-10-CM

## 2025-03-12 PROCEDURE — 99255 IP/OBS CONSLTJ NEW/EST HI 80: CPT

## 2025-03-12 PROCEDURE — 99205 OFFICE O/P NEW HI 60 MIN: CPT

## 2025-03-13 ENCOUNTER — CLAIMS CREATED FROM THE CLAIM WINDOW (OUTPATIENT)
Dept: URBAN - METROPOLITAN AREA MEDICAL CENTER 1 | Facility: MEDICAL CENTER | Age: 65
End: 2025-03-13
Payer: COMMERCIAL

## 2025-03-13 DIAGNOSIS — K74.69 CIRRHOSIS, CRYPTOGENIC: ICD-10-CM

## 2025-03-13 DIAGNOSIS — K76.82 ACUTE HEPATIC ENCEPHALOPATHY: ICD-10-CM

## 2025-03-13 PROCEDURE — 99214 OFFICE O/P EST MOD 30 MIN: CPT | Performed by: REGISTERED NURSE

## 2025-03-13 PROCEDURE — 99232 SBSQ HOSP IP/OBS MODERATE 35: CPT | Performed by: REGISTERED NURSE

## 2025-03-21 ENCOUNTER — LAB OUTSIDE AN ENCOUNTER (OUTPATIENT)
Dept: URBAN - NONMETROPOLITAN AREA CLINIC 2 | Facility: CLINIC | Age: 65
End: 2025-03-21

## 2025-03-31 ENCOUNTER — TELEPHONE ENCOUNTER (OUTPATIENT)
Dept: URBAN - NONMETROPOLITAN AREA CLINIC 2 | Facility: CLINIC | Age: 65
End: 2025-03-31

## 2025-04-01 ENCOUNTER — LAB OUTSIDE AN ENCOUNTER (OUTPATIENT)
Dept: URBAN - NONMETROPOLITAN AREA CLINIC 2 | Facility: CLINIC | Age: 65
End: 2025-04-01

## 2025-04-01 LAB
A/G RATIO: 1
ALBUMIN: 3.6
ALKALINE PHOSPHATASE: 187
ALT (SGPT): 19
ANION GAP: 14
AST (SGOT): 42
BILIRUBIN TOTAL: 2.1
BLOOD UREA NITROGEN: 10
BUN / CREAT RATIO: 17
CALCIUM: 9.3
CHLORIDE: 101
CO2: 30
CREATININE, SERUM: 0.58
EGFR (CKD-EPI): >60
GLUCOSE: 122
HEMATOCRIT: 41.8
HEMOGLOBIN: 14
INR: 1.33
IRON SATURATION: 44
IRON: 135
MEAN CORPUSCULAR HEMOGLOBIN CONC: 33.4
MEAN CORPUSCULAR HEMOGLOBIN: 30.4
MEAN CORPUSCULAR VOLUME: 90.9
MEAN PLATELET VOLUME: 8.9
PLATELET COUNT: 92
POTASSIUM: 3.7
PROTEIN TOTAL: 7.1
PROTHROMBIN TIME: 14.8
RED BLOOD CELL COUNT: 4.6
RED CELL DISTRIBUTION WIDTH: 16.9
SODIUM: 141
TOTAL IRON BINDING CAPACITY: 304
UNSATURATED IRON BINDING CAPACITY: 169
WHITE BLOOD CELL COUNT: 5.5

## 2025-04-09 ENCOUNTER — TELEPHONE ENCOUNTER (OUTPATIENT)
Dept: URBAN - NONMETROPOLITAN AREA CLINIC 2 | Facility: CLINIC | Age: 65
End: 2025-04-09

## 2025-04-09 RX ORDER — RIFAXIMIN 550 MG/1
TAKE 1 TABLET BY MOUTH TWICE (2) DAILY TABLET ORAL
Qty: 180 TABLET | Refills: 3

## 2025-04-15 ENCOUNTER — TELEPHONE ENCOUNTER (OUTPATIENT)
Dept: URBAN - NONMETROPOLITAN AREA CLINIC 2 | Facility: CLINIC | Age: 65
End: 2025-04-15

## 2025-04-15 RX ORDER — FAMOTIDINE 40 MG/1
TAKE 1 TABLET BY ORAL ROUTE AT BEDTIME FOR 90 DAYS TABLET, FILM COATED ORAL ONCE A DAY
Qty: 90 TABLET | Refills: 3

## 2025-04-17 ENCOUNTER — TELEPHONE ENCOUNTER (OUTPATIENT)
Dept: URBAN - NONMETROPOLITAN AREA CLINIC 13 | Facility: CLINIC | Age: 65
End: 2025-04-17

## 2025-04-17 ENCOUNTER — TELEPHONE ENCOUNTER (OUTPATIENT)
Dept: URBAN - NONMETROPOLITAN AREA CLINIC 2 | Facility: CLINIC | Age: 65
End: 2025-04-17

## 2025-04-17 RX ORDER — LACTULOSE 10 G/15ML
15 ML AS NEEDED SOLUTION ORAL THREE TIMES A DAY
Qty: 1350 ML | Refills: 11

## 2025-04-24 ENCOUNTER — TELEPHONE ENCOUNTER (OUTPATIENT)
Dept: URBAN - METROPOLITAN AREA CLINIC 6 | Facility: CLINIC | Age: 65
End: 2025-04-24

## 2025-04-24 ENCOUNTER — LAB OUTSIDE AN ENCOUNTER (OUTPATIENT)
Dept: URBAN - NONMETROPOLITAN AREA CLINIC 2 | Facility: CLINIC | Age: 65
End: 2025-04-24

## 2025-04-24 ENCOUNTER — TELEPHONE ENCOUNTER (OUTPATIENT)
Dept: URBAN - NONMETROPOLITAN AREA CLINIC 2 | Facility: CLINIC | Age: 65
End: 2025-04-24

## 2025-04-24 PROBLEM — 389026000: Status: ACTIVE | Noted: 2025-04-24

## 2025-04-28 ENCOUNTER — TELEPHONE ENCOUNTER (OUTPATIENT)
Dept: URBAN - NONMETROPOLITAN AREA CLINIC 13 | Facility: CLINIC | Age: 65
End: 2025-04-28

## 2025-04-30 ENCOUNTER — TELEPHONE ENCOUNTER (OUTPATIENT)
Dept: URBAN - NONMETROPOLITAN AREA CLINIC 2 | Facility: CLINIC | Age: 65
End: 2025-04-30

## 2025-05-05 ENCOUNTER — LAB OUTSIDE AN ENCOUNTER (OUTPATIENT)
Dept: URBAN - NONMETROPOLITAN AREA CLINIC 2 | Facility: CLINIC | Age: 65
End: 2025-05-05

## 2025-05-05 LAB
BASOPHILS AUTOMATED ABSOLUTE COUNT: 0.1
BASOPHILS RELATIVE PERCENT: 0.9
EOSINOPHILS AUTOMATED ABSOLUTE COUNT: 0.2
EOSINOPHILS RELATIVE PERCENT: 2.5
HEMATOCRIT: 44.1
HEMOGLOBIN: 15.1
INR: 1.31
LYMPHOCYTES AUTOMATED ABSOLUTE COUNT: 1.5
LYMPHOCYTES RELATIVE PERCENT: 22.2
MEAN CORPUSCULAR HEMOGLOBIN CONC: 34.2
MEAN CORPUSCULAR HEMOGLOBIN: 30.8
MEAN CORPUSCULAR VOLUME: 90
MEAN PLATELET VOLUME: 9.4
MONOCYTES AUTOMATED ABSOLUTE COUNT: 0.5
MONOCYTES RELATIVE PERCENT: 7.5
NEUTROPHILS AUTOMATED ABSOLUTE: 4.6
NEUTROPHILS RELATIVE PERCENT: 66.9
PLATELET COUNT: 130
PROTHROMBIN TIME: 14.6
RED BLOOD CELL COUNT: 4.91
RED CELL DISTRIBUTION WIDTH: 14.3
WHITE BLOOD CELL COUNT: 6.9

## 2025-05-12 ENCOUNTER — TELEPHONE ENCOUNTER (OUTPATIENT)
Dept: URBAN - NONMETROPOLITAN AREA CLINIC 2 | Facility: CLINIC | Age: 65
End: 2025-05-12

## 2025-05-16 ENCOUNTER — TELEPHONE ENCOUNTER (OUTPATIENT)
Dept: URBAN - NONMETROPOLITAN AREA CLINIC 13 | Facility: CLINIC | Age: 65
End: 2025-05-16

## 2025-05-16 RX ORDER — RIFAXIMIN 550 MG/1
TAKE 1 TABLET BY MOUTH TWICE (2) DAILY TABLET ORAL
Qty: 180 TABLET | Refills: 3

## 2025-05-23 ENCOUNTER — TELEPHONE ENCOUNTER (OUTPATIENT)
Dept: URBAN - NONMETROPOLITAN AREA CLINIC 2 | Facility: CLINIC | Age: 65
End: 2025-05-23

## 2025-05-23 ENCOUNTER — OFFICE VISIT (OUTPATIENT)
Dept: URBAN - METROPOLITAN AREA MEDICAL CENTER 1 | Facility: MEDICAL CENTER | Age: 65
End: 2025-05-23

## 2025-05-23 RX ORDER — COLESTIPOL HYDROCHLORIDE 1 G/1
1 TABLET 30MINS PRIOR TO A MEAL TABLET, FILM COATED ORAL ONCE A DAY
Qty: 30 TABLET | Refills: 6

## 2025-06-16 ENCOUNTER — TELEPHONE ENCOUNTER (OUTPATIENT)
Dept: URBAN - NONMETROPOLITAN AREA CLINIC 2 | Facility: CLINIC | Age: 65
End: 2025-06-16

## 2025-06-17 ENCOUNTER — OFFICE VISIT (OUTPATIENT)
Dept: URBAN - NONMETROPOLITAN AREA CLINIC 13 | Facility: CLINIC | Age: 65
End: 2025-06-17

## 2025-06-20 ENCOUNTER — OFFICE VISIT (OUTPATIENT)
Dept: URBAN - METROPOLITAN AREA MEDICAL CENTER 1 | Facility: MEDICAL CENTER | Age: 65
End: 2025-06-20
Payer: COMMERCIAL

## 2025-06-20 ENCOUNTER — LAB OUTSIDE AN ENCOUNTER (OUTPATIENT)
Dept: URBAN - NONMETROPOLITAN AREA CLINIC 13 | Facility: CLINIC | Age: 65
End: 2025-06-20

## 2025-06-20 DIAGNOSIS — K31.89 OTHER DISEASES OF STOMACH AND DUODENUM: ICD-10-CM

## 2025-06-20 DIAGNOSIS — K76.6 PORTAL HYPERTENSION: ICD-10-CM

## 2025-06-20 DIAGNOSIS — I85.10 ESOPH VARICE OTHER DIS: ICD-10-CM

## 2025-06-20 PROCEDURE — 43244 EGD VARICES LIGATION: CPT | Performed by: INTERNAL MEDICINE

## 2025-06-24 ENCOUNTER — TELEPHONE ENCOUNTER (OUTPATIENT)
Dept: URBAN - NONMETROPOLITAN AREA CLINIC 2 | Facility: CLINIC | Age: 65
End: 2025-06-24

## 2025-07-02 ENCOUNTER — TELEPHONE ENCOUNTER (OUTPATIENT)
Dept: URBAN - NONMETROPOLITAN AREA CLINIC 13 | Facility: CLINIC | Age: 65
End: 2025-07-02

## 2025-07-02 RX ORDER — COLESTIPOL HYDROCHLORIDE 1 G/1
1 TABLET 30MINS PRIOR TO A MEAL TABLET, FILM COATED ORAL ONCE A DAY
Qty: 30 TABLET | Refills: 6

## 2025-07-16 ENCOUNTER — OFFICE VISIT (OUTPATIENT)
Dept: URBAN - NONMETROPOLITAN AREA CLINIC 13 | Facility: CLINIC | Age: 65
End: 2025-07-16
Payer: COMMERCIAL

## 2025-07-16 ENCOUNTER — LAB OUTSIDE AN ENCOUNTER (OUTPATIENT)
Dept: URBAN - NONMETROPOLITAN AREA CLINIC 13 | Facility: CLINIC | Age: 65
End: 2025-07-16

## 2025-07-16 ENCOUNTER — LAB OUTSIDE AN ENCOUNTER (OUTPATIENT)
Dept: URBAN - NONMETROPOLITAN AREA CLINIC 2 | Facility: CLINIC | Age: 65
End: 2025-07-16

## 2025-07-16 DIAGNOSIS — K74.60 CIRRHOSIS: ICD-10-CM

## 2025-07-16 DIAGNOSIS — K21.9 GERD (GASTROESOPHAGEAL REFLUX DISEASE): ICD-10-CM

## 2025-07-16 DIAGNOSIS — R10.9 ABDOMINAL PAIN: ICD-10-CM

## 2025-07-16 DIAGNOSIS — I85.00 ESOPHAGEAL VARICES DETERMINED BY ENDOSCOPY: ICD-10-CM

## 2025-07-16 DIAGNOSIS — Z85.038 PERSONAL HISTORY OF COLON CANCER: ICD-10-CM

## 2025-07-16 DIAGNOSIS — R11.10 RECURRENT VOMITING: ICD-10-CM

## 2025-07-16 DIAGNOSIS — K76.89: ICD-10-CM

## 2025-07-16 DIAGNOSIS — R19.4 BOWEL HABIT CHANGES: ICD-10-CM

## 2025-07-16 LAB
A/G RATIO: 0.9
ALBUMIN: 3.8
ALKALINE PHOSPHATASE: 219
ALT (SGPT): 19
ANION GAP: 16
AST (SGOT): 32
BASOPHILS AUTOMATED ABSOLUTE COUNT: 0
BASOPHILS RELATIVE PERCENT: 0.5
BILIRUBIN TOTAL: 1.3
BLOOD UREA NITROGEN: 7
BUN / CREAT RATIO: 13
CALCIUM: 9.5
CHLORIDE: 96
CO2: 30
CREATININE, SERUM: 0.56
EGFR (CKD-EPI): >60
EOSINOPHILS AUTOMATED ABSOLUTE COUNT: 0.1
EOSINOPHILS RELATIVE PERCENT: 2.9
GLUCOSE: 145
HEMATOCRIT: 43.6
HEMOGLOBIN: 14.5
INR: 1.21
LYMPHOCYTES AUTOMATED ABSOLUTE COUNT: 1.4
LYMPHOCYTES RELATIVE PERCENT: 27.2
MEAN CORPUSCULAR HEMOGLOBIN CONC: 33.3
MEAN CORPUSCULAR HEMOGLOBIN: 29.5
MEAN CORPUSCULAR VOLUME: 88.6
MEAN PLATELET VOLUME: 9.3
MONOCYTES AUTOMATED ABSOLUTE COUNT: 0.6
MONOCYTES RELATIVE PERCENT: 11.8
NEUTROPHILS AUTOMATED ABSOLUTE: 3
NEUTROPHILS RELATIVE PERCENT: 57.6
PLATELET COUNT: 99
POTASSIUM: 3.8
PROTEIN TOTAL: 8
PROTHROMBIN TIME: 13.5
RED BLOOD CELL COUNT: 4.92
RED CELL DISTRIBUTION WIDTH: 15.7
SODIUM: 138
WHITE BLOOD CELL COUNT: 5.2

## 2025-07-16 PROCEDURE — 99214 OFFICE O/P EST MOD 30 MIN: CPT | Performed by: NURSE PRACTITIONER

## 2025-07-16 RX ORDER — FAMOTIDINE 40 MG/1
TAKE 1 TABLET BY ORAL ROUTE AT BEDTIME FOR 90 DAYS TABLET, FILM COATED ORAL ONCE A DAY
Qty: 90 TABLET | Refills: 3
Start: 2025-07-16

## 2025-07-16 RX ORDER — COLESTIPOL HYDROCHLORIDE 1 G/1
1 TABLET 30MINS PRIOR TO A MEAL TABLET, FILM COATED ORAL ONCE A DAY
Qty: 30 TABLET | Refills: 6 | Status: ACTIVE | COMMUNITY

## 2025-07-16 RX ORDER — DIAZEPAM 2 MG/1
TAKE 1 TABLET BY ORAL ROUTE 30 MINS PRIOR TO MRI TABLET ORAL 2
Qty: 1 | Refills: 0
Start: 2025-07-16

## 2025-07-16 RX ORDER — DULOXETINE HYDROCHLORIDE 60 MG/1
TAKE 1 CAPSULE BY MOUTH EVERY DAY CAPSULE, DELAYED RELEASE PELLETS ORAL
Qty: 90 EACH | Refills: 1 | Status: ACTIVE | COMMUNITY

## 2025-07-16 RX ORDER — PANTOPRAZOLE SODIUM 40 MG/1
1 TABLET TABLET, DELAYED RELEASE ORAL ONCE A DAY
Status: ACTIVE | COMMUNITY

## 2025-07-16 RX ORDER — ATENOLOL 50 MG/1
TABLET ORAL
Qty: 180 TABLET | Status: ACTIVE | COMMUNITY

## 2025-07-16 RX ORDER — METFORMIN HYDROCHLORIDE 1000 MG/1
TAKE 1 TABLET BY MOUTH TWICE DAILY TABLET, FILM COATED ORAL
Qty: 60 EACH | Refills: 6 | Status: ACTIVE | COMMUNITY

## 2025-07-16 RX ORDER — RIFAXIMIN 550 MG/1
1 TABLET TABLET ORAL TWICE A DAY
Qty: 180 TABLET | Refills: 3 | Status: ACTIVE | COMMUNITY

## 2025-07-16 RX ORDER — DIAZEPAM 2 MG/1
TAKE 1 TABLET BY ORAL ROUTE 30 MINS PRIOR TO MRI TABLET ORAL 2
Qty: 1 | Refills: 0 | Status: ACTIVE | COMMUNITY

## 2025-07-16 RX ORDER — TIRZEPATIDE 2.5 MG/.5ML
INJECT 2.5MG UNDER THE SKIN WEEKLY -  START 12/14 INJECTION, SOLUTION SUBCUTANEOUS
Qty: 2 MILLILITER | Refills: 0 | Status: ACTIVE | COMMUNITY

## 2025-07-16 RX ORDER — RIFAXIMIN 550 MG/1
TAKE 1 TABLET BY MOUTH TWICE (2) DAILY TABLET ORAL
Qty: 180 TABLET | Refills: 3 | Status: ACTIVE | COMMUNITY

## 2025-07-16 RX ORDER — RISPERIDONE 0.5 MG/1
TAKE 1 TABLET BY MOUTH EVERY NIGHT AT BEDTIME TABLET ORAL
Qty: 30 EACH | Refills: 0 | Status: ACTIVE | COMMUNITY

## 2025-07-16 RX ORDER — LACTULOSE 10 G/15ML
15 ML AS NEEDED SOLUTION ORAL THREE TIMES A DAY
Qty: 1350 ML | Refills: 11 | OUTPATIENT
Start: 2025-07-16

## 2025-07-16 RX ORDER — OXYCODONE HYDROCHLORIDE 15 MG/1
TABLET ORAL
Qty: 120 TABLET | Status: ACTIVE | COMMUNITY

## 2025-07-16 RX ORDER — TORSEMIDE 10 MG/1
1 TABLET TABLET ORAL ONCE A DAY
Status: ACTIVE | COMMUNITY

## 2025-07-16 RX ORDER — ONDANSETRON 4 MG/1
1 TABLET ON THE TONGUE AND ALLOW TO DISSOLVE TABLET, ORALLY DISINTEGRATING ORAL
Status: ACTIVE | COMMUNITY

## 2025-07-16 RX ORDER — OXCARBAZEPINE 150 MG/1
1 TABLET TABLET, FILM COATED ORAL TWICE A DAY
Status: ACTIVE | COMMUNITY

## 2025-07-16 RX ORDER — POTASSIUM CHLORIDE 20 MEQ/1
TAKE 1 TABLET BY MOUTH EVERY DAY TABLET, EXTENDED RELEASE ORAL
Qty: 30 EACH | Refills: 4 | Status: ACTIVE | COMMUNITY

## 2025-07-16 RX ORDER — LEVOTHYROXINE SODIUM 75 UG/1
1 TABLET IN THE MORNING ON AN EMPTY STOMACH TABLET ORAL ONCE A DAY
Status: ACTIVE | COMMUNITY

## 2025-07-16 RX ORDER — COLESTIPOL HYDROCHLORIDE 1 G/1
1 TABLET 30MINS PRIOR TO A MEAL TABLET, FILM COATED ORAL ONCE A DAY
Qty: 30 TABLET | Refills: 6 | OUTPATIENT
Start: 2025-07-16

## 2025-07-16 RX ORDER — CHLORTHALIDONE 25 MG/1
TAKE 1 TABLET BY MOUTH EVERY DAY TABLET ORAL
Qty: 30 EACH | Refills: 4 | Status: ACTIVE | COMMUNITY

## 2025-07-16 RX ORDER — RIFAXIMIN 550 MG/1
1 TABLET TABLET ORAL TWICE A DAY
Qty: 180 TABLET | Refills: 3 | OUTPATIENT
Start: 2025-07-16 | End: 2026-07-11

## 2025-07-16 RX ORDER — ROPINIROLE HYDROCHLORIDE 1 MG/1
1 TABLET 1 TO 3 HOURS BEFORE BEDTIME TABLET, FILM COATED ORAL ONCE A DAY
Status: ACTIVE | COMMUNITY

## 2025-07-16 RX ORDER — FAMOTIDINE 40 MG/1
TAKE 1 TABLET BY ORAL ROUTE AT BEDTIME FOR 90 DAYS TABLET, FILM COATED ORAL ONCE A DAY
Qty: 90 TABLET | Refills: 3 | Status: ACTIVE | COMMUNITY

## 2025-07-16 RX ORDER — LACTULOSE 10 G/15ML
15 ML AS NEEDED SOLUTION ORAL THREE TIMES A DAY
Qty: 1350 ML | Refills: 11 | Status: ACTIVE | COMMUNITY

## 2025-07-16 NOTE — PHYSICAL EXAM CARDIOVASCULAR:
Tiana MELGAR with [facility] [id number]  no edema, no murmurs, regular rate and rhythm

## 2025-07-16 NOTE — HPI-OTHER HISTORIES
History Of Present Illness    4/2019 Ms. 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 She is a previous patient of Dr. Abdalla and she has been seeing a hepatologist at [facility] every 6 months. She has been getting MRI's every 6 months. Her last MRI was in January with dysplastic appearing nodules. These have been stable. EGD 2018 with trace varices, retained food in her stomach, and portal HTN. She is taking protonix 40mg BID and zantac. Her reflux well controlled. She denies any nausea or vomiting. She denies any ascites. She denies any free bleeding. She had mental changes with being forgetful and sedated in the morning. She had been taking Ambien. This is resolved. She is not taking lactulose.  She has a history of colon cancer in 2011 with resection and chemo. Last colonoscopy was 2018 and normal except for diverticulosis. She is having diarrhea every meal. Imodium is not helping.  CS  6/17/2019 Ms. Correa is here for f/u of Cirrhosis. She is well compensated. She had an MRI with stable dysplastic appearing nodules. Her reflux is well controleld on Protonix 40mg BID. She on atenolol for trace varices. She denies any increase in bleeding or ascites. She has some mild confusion thought to be med related. She was having diarrhea and given colestipol. This made her sick. She is now having constipation. CS  September 23, 2019 Ms. Dunaway is here for f/u of Cirrhosis. Her MRI ni June was stable and due in December. She denies any reflux since taking protonix 40mg BID. She denies any increase in bleeding. She has a sore on her shin that has been present for several months. She saw her PCP with a bx. She has had some all over swelling. Her PCP started her on a diuretic. She has been taking this for a month. Her potassium was a little low in June and has not been rechecked. She was having minor confusion at her last OV. She denies any further issues. She has constipation to diarrhea. She is not taking anything at this time. She is having some LLQ pain after eating. She was given ibuprofen by her PCP. She was given thyroid medication at her last PCP OV. She is not taking this.  She has had some numbness and tingling in her hands. She was told this was coming from her neck. She is seeing a surgeon next month. CS  February 24, 2020 Ms. Correa is here for f/u of Cirrhosis. She had a MRI in December that was stable. Her reflux is well controlled with PPI daily. She has mild breakthrough if she eats late. She denies any increase in bleeding. She denies any abdominal swelling. Her bowels are now normal. She does not need the colestipol or lactulose. She is having surgery next month on her neck. She overall is feeling well. She has not followed up with her PCP regarding her thyroid. CS   10/7/2020 Ms. Correa is here for f/u of Cirrhosis. She had surgery on her neck since her last OV. She is back on pain medication. She is starting to feel better. She is having some worsening reflux with food coming back up when she lays down. She is having some nausea. She is having some RUQ pain intermittently. She is due for MRI for HCC and surveillance of a dysplastic nodule. She denies any swelling, increase in bleeding, or mental changes. Her bowels are currently normal without lactulose. CS   11/18/2020 Ms. Correa is here for f/u of Cirrhosis. She had surgery on her neck and was started on pain medication. This made her reflux worse. She was started on pepcid. She was unable to take this because she felt it made her swell. She is no longer having this symptoms. She does however had a lot of bloating and gas. She is no longer taking lactulose and having some constipation. She had her MRI 11/12 for HCC and surveillance of a dysplastic nodule this was stable. She denies any swelling, increase in bleeding, or mental changes. She is due for colonoscopy next year. CS   2/25/2021 Ms. Correa is here for f/u of Cirrhosis and colon cancer surgery. She is out of her pain medication today and having a lot of nerve pain down her legs. This makes it hard to walk at times. Her reflux is well controlled. Her bowels are more normal without lactulose or colestipol. if she gets backed up she will take lactulose with relief. She denies any ascites. She denies any bleeding. She denies any mental changes. She had her MRI 11/12 for HCC and surveillance of a dysplastic nodule this was stable. Overall, she is feeling well. CS   5/26/2021 Ms. Correa is here for f/u of Cirrhosis and colonoscopy. She continues to have pain but this is fairly well controlled with pain medication. She tries to limit how much she takes. She does have some minor fatigue from this. She denies any confusion or sedation. Her reflux is well controlled. Her bowels are more normal with lactulose prn. She has bloating at times but not too bothersome.  She denies any ascites. She denies any bleeding. She denies any mental changes. She had her MRI 11/12 for HCC and surveillance of a dysplastic nodule this was stable. She is due for repeat. Her repeat colonoscopy showed a 4mm inflammatory polyp in the rectum. Overall, she is feeling well. CS 0  10/4/2021 Ms. Correa is here for f/u of Cirrhosis. Since her last OV, she fell and fractured her patella and bruised her right eye. She was taking to the ER and had a negative head CT. She is making a little trouble getting around with her knee brace but doing ok. She denies any ascites. She has occassional fluid build up everywhere that her PCP has given  her lasix and potassium. She denies any constipation at this time. She will take lactulose if this happens and it works well. She denies any mental changes. She denies is bleeding. Her MRI this summer was negative for HCC and due in December. CS   3/7/2022 Ms. Correa is here for f/u of Cirrhosis. She had a MRI last month negative for ascites and HCC. She has been having some increase in bloating after eating and increase in gas. She denies any constipation at this time and has been having more looser urgent stools. She is not taking the lactulose. She denies any mental changes. She denies is bleeding. Her  is having some back issues and having testing.  CS  6/8/2022 Ms. Correa is here for f/u of Cirrhosis. She had a MRI last month negative for ascites and HCC. She has been having some increase in bloating after eating and increase in gas. She denies any constipation at this time and has been having more looser urgent stools. She is not taking the lactulose. She denies any mental changes. She denies is bleeding. Her  is having some back issues and having testing.  CS  8/31/2022 Ms. Correa is here for f/u of Cirrhosis. She had a MRI 2/2022 negative for ascites and HCC. She has been having RUQ pain after eating. This has gotten worse since her last OV. She has also had more nerve issues and on gabapentin. She is not taking it as rx.  She denies any constipation at this time. She was having some looser stool. She is now on xifaxan and this is helping. She denies any mental change. She is not taking the lactulose. She denies is bleeding. CS  9/2022 MRI: Cirrhosis, negative for HCC  11/18/2022 EGD: 3 columns of non-bleeding grade III varices, lower third, two bands were placed with incomplete eradication of varices. Recommend repeat in 3 months  1/11/2023 Ms. Correa is here for f/u of Cirrhosis. She had a MRI 9/2022 negative for ascites and HCC. She had an EGD for varices. This showed three large columns. Two bands were placed with incomplete eradication. She also had some benign gastric polyps. She was having some looser stool. Xifaxan has helped and now its back to being loose. She has been on colestipol in the past with good result. She denies any mental change. She is not taking the lactulose. She denies is bleeding. CS  5/10/2023 Ms. Correa is here for f/u of Cirrhosis. She had a MRI 1/2023 negative for ascites and HCC. She had an EGD for varices. This showed three large columns. Two bands were placed with incomplete eradication. She had a repeat in March that again was incomplete and recommended repeat. She is on atenolol. Her reflux is well controlled. She has abdominal swelling on and off. This improves after a BM. She tries to avoid salt. She denies any bleeding. She gets confused at times. She is on xifaxan BID. She is not taking lactulose. Her bowels are eradic at times. CS  7/7/2023 EGD: 3 columns of nonbleeding varices, three bands placed, GAVE  8/29/2024 Ms. Correa is here for f/u of Cirrhosis. She had a MRI 7/2023 negative for ascites and HCC. She had an EGD for varices 7/2023. This showed three large columns. She is on atenolol. Her reflux is well controlled. She has abdominal swelling on and off. This improves after a BM. She has constipation to diarrhea. She is not taking anything for this. She has been on more pain meds after fx her wrist. She has had more confusion. She is not taking xifaxan or lactulose. She denies any bleeding. CS  10/21/2024 MRI: no HCC  12/13/2024 EGD: Large varices, two bands placed Colonoscopy: prior end to side ileo colonic anastomosis in the ascending, small TA polyp at 30cm.  2/20/2025 Ms. Correa is here for f/u of Cirrhosis. She had a MRI 10/2024 negative for ascites and HCC. She had an EGD for varices 12/2024 with large varices with two bands placed. She is on atenolol. She denies any bleeding. She does have low iron and getting IV iron through Dr Juarez. Her reflux is no longer controlled. She is having an unsettled stomach worse at night. She has had some nausea and vomiting of food. She has been started on a shot for diabetes- she thinks it is ozempic. She has abdominal swelling on and off. This improves after a BM. She has constipation to diarrhea. She is on xifaxan and lactuloes prn. CS  6/20/2025 EGD: large varices in the lower third, two bands placed. Moderate pHTN gastropathy

## 2025-07-16 NOTE — HPI-TODAY'S VISIT:
7/16/2025 Ms. Correa is here for f/u of Cirrhosis. She had a MRI 10/2024 negative for ascites and HCC. She had an EGD for varices 6/2025 with large varices with two bands placed. She is on atenolol. She denies any bleeding. At her last OV, she was complaining of an unsettled stomach with nausea and vomiting. She had recently been started on ozempic. Due to her varices and risk of bleeding, this was stopped but soon after that she started having confusion and admitted to the hospital in March. She has since been off her colestipol and taking lactulose. She has been rx xifaxan in the past but today she is not sure if she is taking it.  She has abdominal swelling on and off. She was scheduled for a paracentesis but no fluid was found so it was cancelled. This improves after a BM with the lactulose. CS

## 2025-07-23 LAB — AFP-TUMOR MARKER: 8.3

## 2025-08-02 ENCOUNTER — P2P PATIENT RECORD (OUTPATIENT)
Age: 65
End: 2025-08-02

## 2025-08-12 ENCOUNTER — TELEPHONE ENCOUNTER (OUTPATIENT)
Dept: URBAN - NONMETROPOLITAN AREA CLINIC 2 | Facility: CLINIC | Age: 65
End: 2025-08-12